# Patient Record
Sex: FEMALE
[De-identification: names, ages, dates, MRNs, and addresses within clinical notes are randomized per-mention and may not be internally consistent; named-entity substitution may affect disease eponyms.]

---

## 2018-01-22 ENCOUNTER — HOSPITAL ENCOUNTER (INPATIENT)
Dept: HOSPITAL 31 - C.ER | Age: 83
LOS: 7 days | Discharge: HOME | DRG: 194 | End: 2018-01-29
Attending: INTERNAL MEDICINE | Admitting: INTERNAL MEDICINE
Payer: MEDICARE

## 2018-01-22 DIAGNOSIS — J44.0: ICD-10-CM

## 2018-01-22 DIAGNOSIS — E11.42: ICD-10-CM

## 2018-01-22 DIAGNOSIS — I11.0: ICD-10-CM

## 2018-01-22 DIAGNOSIS — N28.9: ICD-10-CM

## 2018-01-22 DIAGNOSIS — T14.90XA: ICD-10-CM
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
## 2018-01-22 DIAGNOSIS — J45.901: ICD-10-CM

## 2018-01-22 DIAGNOSIS — E78.00: ICD-10-CM

## 2018-01-22 DIAGNOSIS — J18.9: Primary | ICD-10-CM

## 2018-01-22 DIAGNOSIS — I50.9: ICD-10-CM

## 2018-01-22 DIAGNOSIS — E03.9: ICD-10-CM

## 2018-01-22 DIAGNOSIS — I25.10: ICD-10-CM

## 2018-01-22 LAB
ALBUMIN SERPL-MCNC: 4 G/DL (ref 3.5–5)
ALBUMIN/GLOB SERPL: 0.9 {RATIO} (ref 1–2.1)
ALT SERPL-CCNC: 34 U/L (ref 9–52)
APTT BLD: 27 SECONDS (ref 21–34)
AST SERPL-CCNC: 37 U/L (ref 14–36)
BACTERIA #/AREA URNS HPF: (no result) /[HPF]
BASOPHILS # BLD AUTO: 0.1 K/UL (ref 0–0.2)
BASOPHILS NFR BLD: 0.6 % (ref 0–2)
BILIRUB UR-MCNC: (no result) MG/DL
BUN SERPL-MCNC: 48 MG/DL (ref 7–17)
CALCIUM SERPL-MCNC: 9 MG/DL (ref 8.6–10.4)
EOSINOPHIL # BLD AUTO: 0 K/UL (ref 0–0.7)
EOSINOPHIL NFR BLD: 0.5 % (ref 0–4)
ERYTHROCYTE [DISTWIDTH] IN BLOOD BY AUTOMATED COUNT: 15.7 % (ref 11.5–14.5)
GFR NON-AFRICAN AMERICAN: 15
GLUCOSE UR STRIP-MCNC: NORMAL MG/DL
HDLC SERPL-MCNC: 32 MG/DL (ref 30–70)
HGB BLD-MCNC: 12.4 G/DL (ref 11–16)
HYALINE CASTS #/AREA URNS LPF: (no result) /LPF (ref 0–2)
INR PPP: 1.2
LDLC SERPL-MCNC: 61 MG/DL (ref 0–129)
LEUKOCYTE ESTERASE UR-ACNC: (no result) LEU/UL
LYMPHOCYTES # BLD AUTO: 1.7 K/UL (ref 1–4.3)
LYMPHOCYTES NFR BLD AUTO: 19.4 % (ref 20–40)
MCH RBC QN AUTO: 30.3 PG (ref 27–31)
MCHC RBC AUTO-ENTMCNC: 33.6 G/DL (ref 33–37)
MCV RBC AUTO: 90.2 FL (ref 81–99)
MONOCYTES # BLD: 1.2 K/UL (ref 0–0.8)
MONOCYTES NFR BLD: 13.8 % (ref 0–10)
NEUTROPHILS # BLD: 5.7 K/UL (ref 1.8–7)
NEUTROPHILS NFR BLD AUTO: 65.7 % (ref 50–75)
NRBC BLD AUTO-RTO: 0 % (ref 0–2)
PH UR STRIP: 5 [PH] (ref 5–8)
PLATELET # BLD: 188 K/UL (ref 130–400)
PMV BLD AUTO: 9.1 FL (ref 7.2–11.7)
PROT UR STRIP-MCNC: (no result) MG/DL
PROTHROMBIN TIME: 13 SECONDS (ref 9.7–12.2)
RBC # BLD AUTO: 4.1 MIL/UL (ref 3.8–5.2)
RBC # UR STRIP: (no result) /UL
SP GR UR STRIP: 1.03 (ref 1–1.03)
SQUAMOUS EPITHIAL: 2 /HPF (ref 0–5)
TROPONIN I SERPL-MCNC: 0.11 NG/ML (ref 0–0.12)
URINE NITRATE: NEGATIVE
UROBILINOGEN UR-MCNC: NORMAL MG/DL (ref 0.2–1)
WBC # BLD AUTO: 8.7 K/UL (ref 4.8–10.8)

## 2018-01-22 RX ADMIN — HUMAN INSULIN SCH: 100 INJECTION, SOLUTION SUBCUTANEOUS at 21:30

## 2018-01-22 RX ADMIN — CILOSTAZOL SCH MG: 50 TABLET ORAL at 18:38

## 2018-01-22 NOTE — C.PDOC
History Of Present Illness


83 y/o female, referred by Dr. WISAM Tobar, presents to the ER complaining of 

weakness and lethargy which has been present for 1 week. Patient states that 

she fell today hitting her had. She reports that she is not eating and drinking 

well. Patient denies having any other injuries and medical complaints. Of note, 

patient lives alone. 


Time Seen by Provider: 01/22/18 13:11


Chief Complaint (Nursing): Weakness/Neurological Deficit


History Per: Patient


History/Exam Limitations: no limitations


Onset/Duration Of Symptoms: Days


Current Symptoms Are (Timing): Still Present


Severity: Moderate





Past Medical History


Reviewed: Historical Data, Nursing Documentation, Vital Signs


Vital Signs: 


 Last Vital Signs











Temp  97.9 F   01/23/18 16:00


 


Pulse  81   01/23/18 16:00


 


Resp  20   01/23/18 16:00


 


BP  145/85   01/23/18 16:00


 


Pulse Ox  99   01/23/18 16:00














- Medical History


PMH: Arthritis, Asthma, Bronchitis, CAD, CHF, COPD, Diabetes, HTN, 

Hypercholesterolemia, Hypothyroidism, Osteoporosis, Peripheral Edema


   Denies: Atrial Fibrillation, Mitral Valve Prolapse, Chronic Kidney Disease


Surgical History: 


   Denies: Pacemaker





- [facility] Procedures








ASSISTANCE WITH RESPIRATORY VENTILATION, <24 HRS, CPAP (04/28/16)


INSERTION OF ENDOTRACHEAL AIRWAY INTO TRACHEA, VIA OPENING (04/28/16)


RESPIRATORY VENTILATION, GREATER THAN 96 CONSECUTIVE HOURS (04/28/16)








Family History: States: No Known Family Hx





- Social History


Hx Tobacco Use: No


Hx Alcohol Use: No


Hx Substance Use: No





- Immunization History


Hx Tetanus Toxoid Vaccination: No


Hx Influenza Vaccination: Yes


Hx Pneumococcal Vaccination: No





Review Of Systems


Except As Marked, All Systems Reviewed And Found Negative.


Constitutional: Positive for: Weakness.  Negative for: Fever, Chills


Neurological: Negative for: Weakness, Numbness





Physical Exam





- Physical Exam


Appears: Non-toxic, No Acute Distress, Other (elderly, [place] woman)


Skin: Normal Color, Warm


Head: Atraumatic, Normacephalic


Eye(s): bilateral: Normal Inspection, PERRL


Nose: Normal


Oral Mucosa: Moist


Neck: Supple


Chest: Symmetrical


Cardiovascular: Rhythm Regular


Respiratory: Normal Breath Sounds, No Accessory Muscle Use, No Rales, No Rhonchi

, No Wheezing


Gastrointestinal/Abdominal: Normal Exam, Soft, No Tenderness


Extremity: Normal ROM


Neurological/Psych: Oriented x3, Normal Speech, Normal Cognition, Normal Motor, 

Normal Sensation





ED Course And Treatment





- Laboratory Results


Result Diagrams: 


 01/23/18 07:45





 01/23/18 07:45


O2 Sat by Pulse Oximetry: 96 (RA)


Pulse Ox Interpretation: Normal





- Physician Consult Information


Time Consulting Physician Contacted: 14:15


Physician Contacted: 


Outcome Of Conversation:  is willing to cover the case.





Medical Decision Making


Medical Decision Making: 


Plan:


--Labs


--Urinalysis


--CT-Head


--CXR








Disposition


Doctor Will See Patient In The: Hospital


Counseled Patient/Family Regarding: Studies Performed, Diagnosis





- Disposition


Disposition: HOSPITALIZED


Disposition Time: 16:00


Condition: GOOD





- Clinical Impression


Clinical Impression: 


 Dizziness








- Scribe Statement


The provider has reviewed the documentation as recorded by the Júnior Dominguez


Provider Attestation: 





All medical record entries made by the Júnior were at my direction and 

personally dictated by me. I have reviewed the chart and agree that the record 

accurately reflects my personal performance of the history, physical exam, 

medical decision making, and the department course for this patient. I have 

also personally directed, reviewed, and agree with the discharge instructions 

and disposition.

## 2018-01-22 NOTE — RAD
Chest x-ray single frontal view 



History: Weak. 



Comparison: 05/08/2016 



Findings: 



Patchy increased markings at the left lung base which may represent 

infiltrate and or atelectasis.  Clinical correlation. 



Diffuse increased interstitial lung markings. 



Right hilar prominence. 



Mild calcification at the aortic knob. 



Tortuous aorta. 



Mild cardiomegaly. 



Degenerative changes in spine and shoulders. 



Impression: 



Patchy increased markings at the left lung base which may represent 

infiltrate and or atelectasis. Clinical correlation.

## 2018-01-22 NOTE — CT
PROCEDURE:  CT HEAD WITHOUT CONTRAST.



HISTORY:

fell 1 wk ago, dizzy, poor gait



COMPARISON:

Noncontrast head CT performed 1/21/15 



TECHNIQUE:

Axial computed tomography images were obtained through the head/brain 

without intravenous contrast.  



Radiation dose:



Total exam DLP = 814.25 mGy-cm.



This CT exam was performed using one or more of the following dose 

reduction techniques: Automated exposure control, adjustment of the 

mA and/or kV according to patient size, and/or use of iterative 

reconstruction technique.



FINDINGS:



HEMORRHAGE:

No intracranial hemorrhage. 



BRAIN:

Diffuse atrophy with prominence of the ventricles and sulci noted. No 

mass effect or edema. Intracranial atherosclerosis. Intracranial 

atherosclerotic. Scattered periventricular and subcortical white 

matter hypodensities, which are nonspecific, but often seen with 

chronic microvascular ischemic disease. Please note that MRI with 

diffusion imaging is more sensitive in the detection of acute 

ischemic event.



VENTRICLES:

No hydrocephalus. 



CALVARIUM:

Unremarkable.



PARANASAL SINUSES:

Mild mucosal thickening bilateral maxillary sinuses. 



MASTOID AIR CELLS:

Unremarkable as visualized. No inflammatory changes.



OTHER FINDINGS:

None.



IMPRESSION:

Generalized atrophy.  Moderate nonspecific white matter changes.

## 2018-01-23 LAB
ALBUMIN SERPL-MCNC: 3.3 G/DL (ref 3.5–5)
ALBUMIN/GLOB SERPL: 1 {RATIO} (ref 1–2.1)
ALT SERPL-CCNC: 31 U/L (ref 9–52)
AST SERPL-CCNC: 28 U/L (ref 14–36)
BASOPHILS # BLD AUTO: 0 K/UL (ref 0–0.2)
BASOPHILS NFR BLD: 0.6 % (ref 0–2)
BUN SERPL-MCNC: 32 MG/DL (ref 7–17)
CALCIUM SERPL-MCNC: 8.3 MG/DL (ref 8.6–10.4)
EOSINOPHIL # BLD AUTO: 0.1 K/UL (ref 0–0.7)
EOSINOPHIL NFR BLD: 1.6 % (ref 0–4)
ERYTHROCYTE [DISTWIDTH] IN BLOOD BY AUTOMATED COUNT: 15.3 % (ref 11.5–14.5)
GFR NON-AFRICAN AMERICAN: 36
HGB BLD-MCNC: 11.1 G/DL (ref 11–16)
LYMPHOCYTES # BLD AUTO: 2.2 K/UL (ref 1–4.3)
LYMPHOCYTES NFR BLD AUTO: 28.8 % (ref 20–40)
MCH RBC QN AUTO: 30.3 PG (ref 27–31)
MCHC RBC AUTO-ENTMCNC: 33.5 G/DL (ref 33–37)
MCV RBC AUTO: 90.2 FL (ref 81–99)
MONOCYTES # BLD: 0.8 K/UL (ref 0–0.8)
MONOCYTES NFR BLD: 10 % (ref 0–10)
NEUTROPHILS # BLD: 4.6 K/UL (ref 1.8–7)
NEUTROPHILS NFR BLD AUTO: 59 % (ref 50–75)
NRBC BLD AUTO-RTO: 0 % (ref 0–2)
PLATELET # BLD: 170 K/UL (ref 130–400)
PMV BLD AUTO: 9.4 FL (ref 7.2–11.7)
RBC # BLD AUTO: 3.66 MIL/UL (ref 3.8–5.2)
WBC # BLD AUTO: 7.8 K/UL (ref 4.8–10.8)

## 2018-01-23 RX ADMIN — CILOSTAZOL SCH MG: 50 TABLET ORAL at 11:15

## 2018-01-23 RX ADMIN — HUMAN INSULIN SCH: 100 INJECTION, SOLUTION SUBCUTANEOUS at 21:53

## 2018-01-23 RX ADMIN — CILOSTAZOL SCH MG: 50 TABLET ORAL at 17:58

## 2018-01-23 RX ADMIN — HUMAN INSULIN SCH UNIT: 100 INJECTION, SOLUTION SUBCUTANEOUS at 17:59

## 2018-01-23 RX ADMIN — HUMAN INSULIN SCH: 100 INJECTION, SOLUTION SUBCUTANEOUS at 13:50

## 2018-01-23 RX ADMIN — HUMAN INSULIN SCH: 100 INJECTION, SOLUTION SUBCUTANEOUS at 08:43

## 2018-01-23 NOTE — CARD
--------------- APPROVED REPORT --------------





EKG Measurement

Heart [id number]

OR 138P-[id number]

[id number]

[id number]

[id number]



<Conclusion>

Sinus rhythm with frequent premature ventricular complexes

Left ventricular hypertrophy with repolarization abnormality

Abnormal ECG

## 2018-01-23 NOTE — RAD
PROCEDURE:  Radiographs of the Lumbar Spine.



HISTORY:

s/p fall







COMPARISON:

No prior.



FINDINGS:



BONES:

Generalized osteopenia, with diffuse spondylosis. There is mild 

increased concavity to the superior T12 endplate - prominent 

Schmorl's node indentation versus of mild compression deformity, less 

25 percent, compatible with this. No decreased height of the anterior 

superior vertebral body corner here.  Schmorl's node indentation 

favored. Prominent thoraco lumbar spondylosis also noted.



Mild anterior subluxation of L5 relative to S1 - - due to inferred 

ligamentous laxity given the facet hypertrophic arthrosis here.  No 

spondylolysis noted. 



Generalized osteopenia 



DISC SPACES:

Thoraco lumbar level segmental disc space narrowing.



OTHER FINDINGS:

Atherosclerotic vascular calcifications descending abdominal aorta 

extending into the common iliac bifurcations.



IMPRESSION:

Probable T12 Schmorl's node indentation with prominent anterior 

spondylosis here. No fracture with retropulsion into the spinal canal 

suggested. .  Generalized osteopenia 



L5-S1 minimal subluxation -attributed to ligamentous laxity- facet 

hypertrophic arthrosis noted



Degenerative disc disease with calcified degenerative disc as well 



Atherosclerotic vascular disease

## 2018-01-23 NOTE — MRI
PROCEDURE:  MRI BRAIN WITHOUT CONTRAST



HISTORY:

s/p fall



COMPARISON:

Noncontrast head CT from 01/22/2018. 



TECHNIQUE:

Multiplanar, multisequence MR images of the brain were obtained 

without intravenous contrast enhancement.



FINDINGS:



HEMORRHAGE:

None



DWI:

No evidence of an acute or early subacute infarction.



BRAIN PARENCHYMA:

There are severe chronic microangiopathic changes. There is no mass, 

mass effect or abnormal extra-axial fluid collection. The midline 

sagittal structures are normal.  There is a prominent perivascular 

space in the right basal ganglia.



VENTRICLES:

There is moderate age-related global parenchymal volume loss and 

proportionate enlargement of the ventricles and cortical sulci. There 

is a cavum septum pellucidum.



CRANIUM:

There is normal bone marrow signal pattern.



ORBITS:

Grossly unremarkable.



PARANASAL SINUSES/MASTOIDS:

There is mild mucosal thickening in the paranasal sinuses. The 

mastoid air cells are clear.



VASCULAR SYSTEM:

There are normal signal voids in the larger intracranial arteries. 



OTHER FINDINGS:

None. 



IMPRESSION:

No acute intracranial abnormality.



Severe chronic microangiopathic changes and moderate age-related 

global parenchymal volume loss.

## 2018-01-23 NOTE — CP.PCM.HP
History of Present Illness





- History of Present Illness


History of Present Illness: 





COMPREHENSIVE   HISTORY & PHYSICAL EXAM 


   


HPI


BROUGHT TO ER AFTER A FALL WHILE GETTING OUT OF [employer]  ON THE DAY OF 

ADMISSION . NO LOC/SEIZURES 


SUSTAINED MILD HEAD TRAUMA .


ER , CT HEAD NEG FOR ACUTE ABNORMALITY , CR HAS INCREASED TO 3.0 FROM BASELINE 

1.2 





PAST HIST.


T2DM .HTN.


PERSONAL HIST:   Smoking.   N   Alcohol.   N      Allergy N            Travel_-

      .


FAMILY HIST : 


ROS :


Constitutional: Negative for weight change, chills, night sweats, fatigue and 

usage of assist device. 


  Eyes: Negative for redness, swelling, itching, discharge, vision changes, 

blurry vision, double vision, glaucoma, cataracts, 


  Ears: Negative for hearing loss, ringing, , tinnitus, vertigo


 Nose: Negative for rhinorrhea, stuffiness, sniffing, itching, postnasal drip, 

discoloration, nasal congestion and epistaxis. 


 Throat: Negative for throat clearing, sore throat, hoarseness, difficulty 

swallowing and difficulty speaking. 


  Respiratory: Negative for cough, , sputum production, chest tightness,  

wheezing, pleuritic chest pain ,daytime somnolence, chronic cough, hemoptysis, 

snoring at night,


 Cardiovascular: Negative for chest pain, palpitations, orthopnea, PND, Edema 

of legs, leg cramps, angina, claudication, , irregular heartbeat,


 Neurology: Negative for irritability, muscle weakness, numbness and tingling, 

seizures, tremors, migraines, slurred speech, syncope, memory loss, mood changes

, recurrent headaches  LOWER EXT NUMBNESS AND LOSS OF SENSATION 


Gastrointestinal: Negative for difficulty swallowing, diarrhea, constipation, 

black stools, rectal bleeding, nausea, flatulence, reflux, poor appetite, 

changes in bowel habits, abdominal pain


  Genitourinary: Negative for frequent urination, hematuria, discharge, 

incontinence, urinary retention, frequent UTI, Psychiatric: Negative for 

depression, anxiety/panic, suicidal tendencies, 


Musculoskeletal: LOWER BACK PAIN  


 Skin: Negative for rash, ulcers, itching, dry skin and pigmented lesions.


P/E: 


  Constitutional: Appears stated age and in no apparent distress. 


 Head: Normocephalic. 


  Ears: External ear canals patent without inflammation. Tympanic membranes 

intact with normal light reflex and landmark. 


  Eyes: Pupils are central, bilaterally equal, symmetrical and reacts to light 

with normal movements and no icterus or pallor. 


 Nose: External nares are patent. Mucosa is pink  Mouth-Throat: Good general 

appearance and condition. No post-pharyngeal/oropharyngeal erythema and 

tonsillar hypertrophy. Good dental hygiene. 


  Neck-Lymphatic: Neck is supple with normal ROM, no thyromegaly, lymph nodes 

or masses. JVD is normal with no carotid bruit. 


  Lungs: Clear to percussion and auscultation with bilateral normal air entry. 


  Cardiovascular: S1 and S2 are normal with no murmurs, gallops and rub. 


  GI Exam: No hepatomegaly. Abdomen is soft and non-tender. No Organomegaly , 

masses or hernias are evident and bowel sounds are normal and active. 


  Neurology: Higher function and all cranial nerves intact, with no gross motor 

or sensory deficit. Superficial and deep reflexes are normal with downwards 

planters. No cerebellar deficit with normal gait. DECREASE SENSATION LOWER EXT/

SOLES 


  Musculoskeletal: No tender spots with normal curvature of the spine with no 

swelling or restricted ROM of the small and large joints. 


  Extremities: Homans sign absent. Intact pulses with no pitting edema, calf 

tenderness or skin color changes. 


  Skin: No rash, eruptions or abnormal skin pigmentation





LAB/RADIOLOGY:


ASSESMENT :


H/O FALL 


ACUTE RENAL INSUFFICIENCY 


DIABETIC PERIPHERAL NEUROPATHY 


T2DM 


LOW BACK PAIN 





PLAN: 


IV FLUID/MRI HEAD/X L/S SPINE 








Present on Admission





- Present on Admission


Any Indicators Present on Admission: No





Past Patient History





- Past Medical History & Family History


Past Medical History?: Yes





- Past Social History


Smoking Status: Never Smoked





- CARDIAC


Hx Atrial Fibrillation: No


Hx Congestive Heart Failure: Yes


Hx Hypercholesterolemia: Yes


Hx Hypertension: Yes


Hx Mitral Valve Prolapse: No


Hx Pacemaker: No


Hx Peripheral Edema: Yes





- PULMONARY


Hx Asthma: Yes


Hx Bronchitis: Yes


Hx Chronic Obstructive Pulmonary Disease (COPD): Yes





- NEUROLOGICAL


Hx Neurological Disorder: No


Hx Dizziness: Yes





- HEENT


Hx HEENT Problems: Yes


Hx Cataracts: Yes (cataract surgery 3 years ago?)


Hx Glaucoma: No


Other/Comment: wear eyeglasses





- RENAL


Hx Chronic Kidney Disease: No





- ENDOCRINE/METABOLIC


Hx Hypothyroidism: Yes





- HEMATOLOGICAL/ONCOLOGICAL


Hx Blood Disorders: No





- INTEGUMENTARY


Hx Dermatological Problems: No





- MUSCULOSKELETAL/RHEUMATOLOGICAL


Hx Arthritis: Yes


Hx Falls: Yes (from home)


Hx Osteoporosis: Yes





- GASTROINTESTINAL


Hx Gastrointestinal Disorders: No





- GENITOURINARY/GYNECOLOGICAL


Hx Genitourinary Disorders: No





- PSYCHIATRIC


Hx Substance Use: No





- SURGICAL HISTORY


Hx Surgeries: Yes


Hx Orthopedic Surgery: Yes (left arm surgery 8 yrs ago)





- ANESTHESIA


Hx Anesthesia: Yes


Hx Anesthesia Reactions: No


Hx Malignant Hyperthermia: No





Meds


Allergies/Adverse Reactions: 


 Allergies











Allergy/AdvReac Type Severity Reaction Status Date / Time


 


No Known Allergies Allergy   Verified 01/22/18 12:21














Results





- Vital Signs


Recent Vital Signs: 





 Last Vital Signs











Temp  98.1 F   01/23/18 07:50


 


Pulse  72   01/23/18 07:50


 


Resp  20   01/23/18 07:50


 


BP  115/67   01/23/18 07:50


 


Pulse Ox  99   01/23/18 07:50














- Labs


Result Diagrams: 


 01/23/18 07:45





 01/23/18 07:45


Labs: 





 Laboratory Results - last 24 hr











  01/22/18 01/22/18 01/22/18





  13:24 13:24 13:24


 


WBC  8.7  


 


RBC  4.10  


 


Hgb  12.4  D  


 


Hct  37.0  


 


MCV  90.2  D  


 


MCH  30.3  


 


MCHC  33.6  


 


RDW  15.7 H  


 


Plt Count  188  


 


MPV  9.1  


 


Neut % (Auto)  65.7  


 


Lymph % (Auto)  19.4 L  


 


Mono % (Auto)  13.8 H  


 


Eos % (Auto)  0.5  


 


Baso % (Auto)  0.6  


 


Neut #  5.7  


 


Lymph #  1.7  


 


Mono #  1.2 H  


 


Eos #  0.0  


 


Baso #  0.1  


 


PT   13.0 H 


 


INR   1.2 


 


APTT   27 


 


Sodium    134


 


Potassium    5.4 H


 


Chloride    103


 


Carbon Dioxide    19 L


 


Anion Gap    17


 


BUN    48 H


 


Creatinine    3.0 H


 


Est GFR ( Amer)    18


 


Est GFR (Non-Af Amer)    15


 


POC Glucose (mg/dL)   


 


Random Glucose    133 H


 


Hemoglobin A1c   


 


Calcium    9.0


 


Total Bilirubin    0.7


 


AST    37 H


 


ALT    34


 


Alkaline Phosphatase    39


 


Troponin I    0.1080


 


Total Protein    8.2


 


Albumin    4.0


 


Globulin    4.2 H


 


Albumin/Globulin Ratio    0.9 L


 


Triglycerides    93  D


 


Cholesterol    117


 


LDL Cholesterol Direct    61


 


HDL Cholesterol    32


 


Urine Color   


 


Urine Clarity   


 


Urine pH   


 


Ur Specific Gravity   


 


Urine Protein   


 


Urine Glucose (UA)   


 


Urine Ketones   


 


Urine Blood   


 


Urine Nitrate   


 


Urine Bilirubin   


 


Urine Urobilinogen   


 


Ur Leukocyte Esterase   


 


Urine WBC (Auto)   


 


Urine RBC (Auto)   


 


Ur Squamous Epith Cells   


 


Urine Bacteria   


 


Hyaline Casts   














  01/22/18 01/22/18 01/22/18





  13:24 14:49 17:10


 


WBC   


 


RBC   


 


Hgb   


 


Hct   


 


MCV   


 


MCH   


 


MCHC   


 


RDW   


 


Plt Count   


 


MPV   


 


Neut % (Auto)   


 


Lymph % (Auto)   


 


Mono % (Auto)   


 


Eos % (Auto)   


 


Baso % (Auto)   


 


Neut #   


 


Lymph #   


 


Mono #   


 


Eos #   


 


Baso #   


 


PT   


 


INR   


 


APTT   


 


Sodium   


 


Potassium   


 


Chloride   


 


Carbon Dioxide   


 


Anion Gap   


 


BUN   


 


Creatinine   


 


Est GFR ( Amer)   


 


Est GFR (Non-Af Amer)   


 


POC Glucose (mg/dL)    72


 


Random Glucose   


 


Hemoglobin A1c  7.5 H  


 


Calcium   


 


Total Bilirubin   


 


AST   


 


ALT   


 


Alkaline Phosphatase   


 


Troponin I   


 


Total Protein   


 


Albumin   


 


Globulin   


 


Albumin/Globulin Ratio   


 


Triglycerides   


 


Cholesterol   


 


LDL Cholesterol Direct   


 


HDL Cholesterol   


 


Urine Color   Carolyn 


 


Urine Clarity   Hazy 


 


Urine pH   5.0 


 


Ur Specific Gravity   1.026 


 


Urine Protein   1+ H 


 


Urine Glucose (UA)   Normal 


 


Urine Ketones   Negative 


 


Urine Blood   2+ H 


 


Urine Nitrate   Negative 


 


Urine Bilirubin   1+ H 


 


Urine Urobilinogen   Normal 


 


Ur Leukocyte Esterase   1+ H 


 


Urine WBC (Auto)   6 H 


 


Urine RBC (Auto)   12 H 


 


Ur Squamous Epith Cells   2 


 


Urine Bacteria   Rare 


 


Hyaline Casts   6-10 H 














  01/22/18 01/23/18 01/23/18





  21:11 07:14 07:45


 


WBC    7.8


 


RBC    3.66 L


 


Hgb    11.1


 


Hct    33.0 L


 


MCV    90.2


 


MCH    30.3


 


MCHC    33.5


 


RDW    15.3 H


 


Plt Count    170


 


MPV    9.4


 


Neut % (Auto)    59.0


 


Lymph % (Auto)    28.8


 


Mono % (Auto)    10.0


 


Eos % (Auto)    1.6


 


Baso % (Auto)    0.6


 


Neut #    4.6


 


Lymph #    2.2


 


Mono #    0.8


 


Eos #    0.1


 


Baso #    0.0


 


PT   


 


INR   


 


APTT   


 


Sodium   


 


Potassium   


 


Chloride   


 


Carbon Dioxide   


 


Anion Gap   


 


BUN   


 


Creatinine   


 


Est GFR ( Amer)   


 


Est GFR (Non-Af Amer)   


 


POC Glucose (mg/dL)  295 H  105 


 


Random Glucose   


 


Hemoglobin A1c   


 


Calcium   


 


Total Bilirubin   


 


AST   


 


ALT   


 


Alkaline Phosphatase   


 


Troponin I   


 


Total Protein   


 


Albumin   


 


Globulin   


 


Albumin/Globulin Ratio   


 


Triglycerides   


 


Cholesterol   


 


LDL Cholesterol Direct   


 


HDL Cholesterol   


 


Urine Color   


 


Urine Clarity   


 


Urine pH   


 


Ur Specific Gravity   


 


Urine Protein   


 


Urine Glucose (UA)   


 


Urine Ketones   


 


Urine Blood   


 


Urine Nitrate   


 


Urine Bilirubin   


 


Urine Urobilinogen   


 


Ur Leukocyte Esterase   


 


Urine WBC (Auto)   


 


Urine RBC (Auto)   


 


Ur Squamous Epith Cells   


 


Urine Bacteria   


 


Hyaline Casts   














  01/23/18 01/23/18





  07:45 11:01


 


WBC  


 


RBC  


 


Hgb  


 


Hct  


 


MCV  


 


MCH  


 


MCHC  


 


RDW  


 


Plt Count  


 


MPV  


 


Neut % (Auto)  


 


Lymph % (Auto)  


 


Mono % (Auto)  


 


Eos % (Auto)  


 


Baso % (Auto)  


 


Neut #  


 


Lymph #  


 


Mono #  


 


Eos #  


 


Baso #  


 


PT  


 


INR  


 


APTT  


 


Sodium  133 


 


Potassium  4.3 


 


Chloride  108 H 


 


Carbon Dioxide  18 L 


 


Anion Gap  12 


 


BUN  32 H 


 


Creatinine  1.4 H 


 


Est GFR ( Amer)  44 


 


Est GFR (Non-Af Amer)  36 


 


POC Glucose (mg/dL)   170 H


 


Random Glucose  117 H 


 


Hemoglobin A1c  


 


Calcium  8.3 L 


 


Total Bilirubin  0.4 


 


AST  28 


 


ALT  31 


 


Alkaline Phosphatase  48 


 


Troponin I  


 


Total Protein  6.8 


 


Albumin  3.3 L 


 


Globulin  3.5 


 


Albumin/Globulin Ratio  1.0 


 


Triglycerides  


 


Cholesterol  


 


LDL Cholesterol Direct  


 


HDL Cholesterol  


 


Urine Color  


 


Urine Clarity  


 


Urine pH  


 


Ur Specific Gravity  


 


Urine Protein  


 


Urine Glucose (UA)  


 


Urine Ketones  


 


Urine Blood  


 


Urine Nitrate  


 


Urine Bilirubin  


 


Urine Urobilinogen  


 


Ur Leukocyte Esterase  


 


Urine WBC (Auto)  


 


Urine RBC (Auto)  


 


Ur Squamous Epith Cells  


 


Urine Bacteria  


 


Hyaline Casts

## 2018-01-24 LAB
ALBUMIN SERPL-MCNC: 3.6 G/DL (ref 3.5–5)
ALBUMIN/GLOB SERPL: 1 {RATIO} (ref 1–2.1)
ALT SERPL-CCNC: 32 U/L (ref 9–52)
AST SERPL-CCNC: 29 U/L (ref 14–36)
BUN SERPL-MCNC: 12 MG/DL (ref 7–17)
CALCIUM SERPL-MCNC: 9.1 MG/DL (ref 8.6–10.4)
ERYTHROCYTE [DISTWIDTH] IN BLOOD BY AUTOMATED COUNT: 15.2 % (ref 11.5–14.5)
GFR NON-AFRICAN AMERICAN: > 60
HGB BLD-MCNC: 11.9 G/DL (ref 11–16)
MCH RBC QN AUTO: 30.4 PG (ref 27–31)
MCHC RBC AUTO-ENTMCNC: 33.8 G/DL (ref 33–37)
MCV RBC AUTO: 89.8 FL (ref 81–99)
PLATELET # BLD: 187 K/UL (ref 130–400)
PMV BLD AUTO: 9 FL (ref 7.2–11.7)
RBC # BLD AUTO: 3.92 MIL/UL (ref 3.8–5.2)
WBC # BLD AUTO: 8 K/UL (ref 4.8–10.8)

## 2018-01-24 RX ADMIN — HUMAN INSULIN SCH: 100 INJECTION, SOLUTION SUBCUTANEOUS at 13:21

## 2018-01-24 RX ADMIN — CILOSTAZOL SCH MG: 50 TABLET ORAL at 17:51

## 2018-01-24 RX ADMIN — HUMAN INSULIN SCH: 100 INJECTION, SOLUTION SUBCUTANEOUS at 08:12

## 2018-01-24 RX ADMIN — CILOSTAZOL SCH MG: 50 TABLET ORAL at 11:40

## 2018-01-24 RX ADMIN — IPRATROPIUM BROMIDE AND ALBUTEROL SULFATE SCH ML: .5; 3 SOLUTION RESPIRATORY (INHALATION) at 19:47

## 2018-01-24 RX ADMIN — HUMAN INSULIN SCH UNIT: 100 INJECTION, SOLUTION SUBCUTANEOUS at 16:30

## 2018-01-24 NOTE — CP.PCM.PN
Subjective





- Date & Time of Evaluation


Date of Evaluation: 01/24/18


Time of Evaluation: 13:21





- Subjective


Subjective: 





CHIEF COMPLAINTS TODAY :


GEN WEAKNESS , WALKS WITH ASSISTANCE


CXR  ? PNEUMONIA  





ROS.


HEENT :  N.


Resp :       No  wheezing ,pleuritic CP ,or hemoptysis 


Cardio :     No anginal  CP, PND, orthopnea, palpitation 


GI :           No abd.pain, n/v ,diarrhea or GI bleeding .


CNS : No headache, vertigo, focal deficit.


Musculoskel :  No joint swelling ,


Derm :        No rash 


Psych :     Normal affect.


Ext :  No  swelling ,calf pain 





PE.


Pt. is alert awake in no distress.


V.S  As noted in the chart 


Head ,ear nose,throat and eyes : Normal.


Neck : Supple with normal carotids.


Lungs: Clear air entry.


Heart : S1 & S2 normal with S4. No murmur.


Abd : Soft non tender with normal bowel sounds.


Neuro : Moves all ext. with no localized deficit.


Ext : No edema with intact pulses.Non tender calves 


Derm : No rashes or decubitus ulcer.





LABS/RADIOLOGY: MRI   BRAIN NEG FOR ACUTE CHANGES 





ASSESSMENT/PLAN : 


ID EVAL 


PT 











Objective





- Vital Signs/Intake and Output


Vital Signs (last 24 hours): 


 











Temp Pulse Resp BP Pulse Ox


 


 98.2 F   82   20   161/70 H  95 


 


 01/24/18 08:28  01/24/18 08:28  01/24/18 08:28  01/24/18 11:45  01/24/18 08:28











- Medications


Medications: 


 Current Medications





Albuterol/Ipratropium (Duoneb 3 Mg/0.5 Mg (3 Ml) Ud)  3 ml INH RQ6 [facility]


Aspirin (Aspirin Chewable)  81 mg PO DAILY [facility]


   Last Admin: 01/24/18 11:42 Dose:  81 mg


Cilostazol (Pletal)  50 mg PO BID [facility]


   Last Admin: 01/24/18 11:40 Dose:  50 mg


Gabapentin (Neurontin)  300 mg PO DAILY [facility]


   Last Admin: 01/24/18 11:42 Dose:  300 mg


Heparin Sodium (Porcine) (Heparin)  5,000 units SC Q12 [facility]


   Last Admin: 01/22/18 21:33 Dose:  5,000 units


Home Med (Patient's Own Drops)  2 drop OU BID [facility]


   Last Admin: 01/24/18 11:43 Dose:  2 drop


Azithromycin 500 mg/ Dextrose  250 mls @ 167 mls/hr IVPB Q24H [facility]


   Last Admin: 01/23/18 21:52 Dose:  167 mls/hr


Insulin Human Regular (Novolin R)  0 unit SC ACHS [facility]


   PRN Reason: Protocol


   Last Admin: 01/24/18 08:12 Dose:  Not Given


Losartan Potassium (Cozaar)  100 mg PO DAILY [facility]


   Last Admin: 01/24/18 11:42 Dose:  100 mg


Metoprolol Tartrate (Lopressor)  50 mg PO BID [facility]


   Last Admin: 01/24/18 11:42 Dose:  50 mg











- Labs


Labs: 


 





 01/24/18 08:02 





 01/24/18 08:02 





 











PT  13.0 SECONDS (9.7-12.2)  H  01/22/18  13:24    


 


INR  1.2   01/22/18  13:24    


 


APTT  27 SECONDS (21-34)   01/22/18  13:24

## 2018-01-24 NOTE — CT
PROCEDURE:  CT Chest without contrast



HISTORY:

pneumonia



COMPARISON:

5/3/2016



TECHNIQUE:

Contiguous axial images were obtained through the chest without 

intravenous contrast enhancement. Sagittal and coronal 

reconstructions were performed.







Radiation dose (DLP): 425.10 mGy-cm. 



This CT exam was performed using one or more of the following dose 

reduction techniques: Automated exposure control, adjustment of the 

mA and/or kV according to patient size, and/or use of iterative 

reconstruction technique.



FINDINGS:



LUNGS:

Multifocal ground-glass opacities in the upper lobes.  Compared to 

prior examination, there has been clearing of the lower lobe 

ground-glass opacities. Probable fibrotic interstitial changes at the 

lung bases. There are some patchy areas of suzy consolidation in the 

right upper lobe. 



MEDIASTINUM:

Unremarkable thoracic aorta. No aneurysm. Cardiomegaly.  Mitral 

annular calcification.  Coronary arterial calcification. Main 

pulmonary artery unremarkable. No vascular congestion. Shotty lymph 

nodes. No significantly enlarged mediastinal or hilar nodes are 

appreciated.  There is a nodule in the thyroid isthmus measuring 

approximately 1.5 cm.  Recommend correlation with thyroid ultrasound 

examination.



PLEURA:

No pleural fluid. No pneumothorax.



BONES:

No fracture. No destructive lesion. 



UPPER ABDOMEN:

Mild bilateral adrenal hypertrophy.



OTHER FINDINGS:

None.



IMPRESSION:

Multifocal ground-glass opacities in the upper lobes with some patchy 

areas of suzy consolidation in the right upper lobe. Possible 

pneumonia. Interstitial fibrotic changes at the lung bases. 

Cardiomegaly. Additional minor findings as above.

## 2018-01-24 NOTE — CP.PCM.CON
History of Present Illness





- History of Present Illness


History of Present Illness: 





INFECTIOUS DISEASE CONSULT.


PATIENT SEEN.





CHART REVIEWED, STRESS WITH FAMILY MEMBERS DAUGHTER AT THE BEDSIDE.





INFECTIOUS DISEASE CONSULT DICTATED.


DICTATION #[id number].





Past Patient History





- Past Medical History & Family History


Past Medical History?: Yes





- Past Social History


Smoking Status: Never Smoked





- CARDIAC


Hx Atrial Fibrillation: No


Hx Congestive Heart Failure: Yes


Hx Hypercholesterolemia: Yes


Hx Hypertension: Yes


Hx Mitral Valve Prolapse: No


Hx Pacemaker: No


Hx Peripheral Edema: Yes





- PULMONARY


Hx Asthma: Yes


Hx Bronchitis: Yes


Hx Chronic Obstructive Pulmonary Disease (COPD): Yes





- NEUROLOGICAL


Hx Neurological Disorder: No


Hx Dizziness: Yes





- HEENT


Hx HEENT Problems: Yes


Hx Cataracts: Yes (cataract surgery 3 years ago?)


Hx Glaucoma: No


Other/Comment: wear eyeglasses





- RENAL


Hx Chronic Kidney Disease: No





- ENDOCRINE/METABOLIC


Hx Hypothyroidism: Yes





- HEMATOLOGICAL/ONCOLOGICAL


Hx Blood Disorders: No





- INTEGUMENTARY


Hx Dermatological Problems: No





- MUSCULOSKELETAL/RHEUMATOLOGICAL


Hx Arthritis: Yes


Hx Osteoporosis: Yes





- GASTROINTESTINAL


Hx Gastrointestinal Disorders: No





- GENITOURINARY/GYNECOLOGICAL


Hx Genitourinary Disorders: No





- PSYCHIATRIC


Hx Substance Use: No





- SURGICAL HISTORY


Hx Surgeries: Yes


Hx Orthopedic Surgery: Yes (left arm surgery 8 yrs ago)





- ANESTHESIA


Hx Anesthesia: Yes


Hx Anesthesia Reactions: No


Hx Malignant Hyperthermia: No





Meds


Allergies/Adverse Reactions: 


 Allergies











Allergy/AdvReac Type Severity Reaction Status Date / Time


 


No Known Allergies Allergy   Verified 01/22/18 12:21














- Medications


Medications: 


 Current Medications





Albuterol/Ipratropium (Duoneb 3 Mg/0.5 Mg (3 Ml) Ud)  3 ml INH RQ6 [facility]


Aspirin (Aspirin Chewable)  81 mg PO DAILY [facility]


   Last Admin: 01/24/18 11:42 Dose:  81 mg


Cilostazol (Pletal)  50 mg PO BID [facility]


   Last Admin: 01/24/18 11:40 Dose:  50 mg


Gabapentin (Neurontin)  300 mg PO DAILY [facility]


   Last Admin: 01/24/18 11:42 Dose:  300 mg


Heparin Sodium (Porcine) (Heparin)  5,000 units SC Q12 [facility]


   Last Admin: 01/22/18 21:33 Dose:  5,000 units


Home Med (Patient's Own Drops)  2 drop OU BID [facility]


   Last Admin: 01/24/18 11:43 Dose:  2 drop


Azithromycin 500 mg/ Dextrose  250 mls @ 167 mls/hr IVPB Q24H [facility]


   Last Admin: 01/23/18 21:52 Dose:  167 mls/hr


Insulin Human Regular (Novolin R)  0 unit SC ACHS [facility]


   PRN Reason: Protocol


   Last Admin: 01/24/18 13:21 Dose:  Not Given


Losartan Potassium (Cozaar)  100 mg PO DAILY [facility]


   Last Admin: 01/24/18 11:42 Dose:  100 mg


Metoprolol Tartrate (Lopressor)  50 mg PO BID [facility]


   Last Admin: 01/24/18 11:42 Dose:  50 mg











Results





- Vital Signs


Recent Vital Signs: 


 Last Vital Signs











Temp  98.2 F   01/24/18 08:28


 


Pulse  82   01/24/18 08:28


 


Resp  20   01/24/18 08:28


 


BP  161/70 H  01/24/18 11:45


 


Pulse Ox  95   01/24/18 08:28














- Labs


Result Diagrams: 


 01/24/18 08:02





 01/24/18 08:02


Labs: 


 Laboratory Results - last 24 hr











  01/23/18 01/23/18 01/24/18





  16:24 21:08 07:39


 


WBC   


 


RBC   


 


Hgb   


 


Hct   


 


MCV   


 


MCH   


 


MCHC   


 


RDW   


 


Plt Count   


 


MPV   


 


Sodium   


 


Potassium   


 


Chloride   


 


Carbon Dioxide   


 


Anion Gap   


 


BUN   


 


Creatinine   


 


Est GFR ( Amer)   


 


Est GFR (Non-Af Amer)   


 


POC Glucose (mg/dL)  197 H  101  112 H


 


Random Glucose   


 


Calcium   


 


Total Bilirubin   


 


AST   


 


ALT   


 


Alkaline Phosphatase   


 


Total Protein   


 


Albumin   


 


Globulin   


 


Albumin/Globulin Ratio   














  01/24/18 01/24/18 01/24/18





  08:02 08:02 11:35


 


WBC  8.0  


 


RBC  3.92  


 


Hgb  11.9  


 


Hct  35.2  


 


MCV  89.8  


 


MCH  30.4  


 


MCHC  33.8  


 


RDW  15.2 H  


 


Plt Count  187  


 


MPV  9.0  


 


Sodium   131 L 


 


Potassium   4.7 


 


Chloride   103 


 


Carbon Dioxide   22 


 


Anion Gap   10 


 


BUN   12 


 


Creatinine   0.8 


 


Est GFR ( Amer)   > 60 


 


Est GFR (Non-Af Amer)   > 60 


 


POC Glucose (mg/dL)    163 H


 


Random Glucose   122 H 


 


Calcium   9.1 


 


Total Bilirubin   0.8 


 


AST   29 


 


ALT   32 


 


Alkaline Phosphatase   46 


 


Total Protein   7.2 


 


Albumin   3.6 


 


Globulin   3.6 


 


Albumin/Globulin Ratio   1.0

## 2018-01-25 LAB
ALBUMIN SERPL-MCNC: 3.5 G/DL (ref 3.5–5)
ALBUMIN/GLOB SERPL: 1 {RATIO} (ref 1–2.1)
ALT SERPL-CCNC: 38 U/L (ref 9–52)
AST SERPL-CCNC: 32 U/L (ref 14–36)
BILIRUB DIRECT SERPL-MCNC: 0.5 MG/DL (ref 0–0.4)
BNP SERPL-MCNC: 2670 PG/ML (ref 0–900)
MYCOPLASMA PNEUMONIAE IGM: NEGATIVE

## 2018-01-25 RX ADMIN — HUMAN INSULIN SCH UNIT: 100 INJECTION, SOLUTION SUBCUTANEOUS at 12:27

## 2018-01-25 RX ADMIN — IPRATROPIUM BROMIDE AND ALBUTEROL SULFATE SCH: .5; 3 SOLUTION RESPIRATORY (INHALATION) at 13:51

## 2018-01-25 RX ADMIN — HUMAN INSULIN SCH: 100 INJECTION, SOLUTION SUBCUTANEOUS at 08:12

## 2018-01-25 RX ADMIN — HUMAN INSULIN SCH: 100 INJECTION, SOLUTION SUBCUTANEOUS at 21:48

## 2018-01-25 RX ADMIN — CILOSTAZOL SCH MG: 50 TABLET ORAL at 10:47

## 2018-01-25 RX ADMIN — CILOSTAZOL SCH MG: 50 TABLET ORAL at 18:27

## 2018-01-25 RX ADMIN — IPRATROPIUM BROMIDE AND ALBUTEROL SULFATE SCH: .5; 3 SOLUTION RESPIRATORY (INHALATION) at 01:40

## 2018-01-25 RX ADMIN — IPRATROPIUM BROMIDE AND ALBUTEROL SULFATE SCH ML: .5; 3 SOLUTION RESPIRATORY (INHALATION) at 09:18

## 2018-01-25 RX ADMIN — HUMAN INSULIN SCH UNIT: 100 INJECTION, SOLUTION SUBCUTANEOUS at 18:41

## 2018-01-25 RX ADMIN — IPRATROPIUM BROMIDE AND ALBUTEROL SULFATE SCH ML: .5; 3 SOLUTION RESPIRATORY (INHALATION) at 19:45

## 2018-01-25 NOTE — CP.PCM.PN
Subjective





- Date & Time of Evaluation


Date of Evaluation: 01/25/18


Time of Evaluation: 13:31





- Subjective


Subjective: 





CHIEF COMPLAINTS TODAY :


GEN WEAKNESS , WALKS WITH ASSISTANCE


CXR  ? PNEUMONIA  





ROS.


HEENT :  N.


Resp :       No  wheezing ,pleuritic CP ,or hemoptysis 


Cardio :     No anginal  CP, PND, orthopnea, palpitation 


GI :           No abd.pain, n/v ,diarrhea or GI bleeding .


CNS : No headache, vertigo, focal deficit.


Musculoskel :  No joint swelling ,


Derm :        No rash 


Psych :     Normal affect.


Ext :  No  swelling ,calf pain 





PE.


Pt. is alert awake in no distress.


V.S  As noted in the chart 


Head ,ear nose,throat and eyes : Normal.


Neck : Supple with normal carotids.


Lungs: Clear air entry.


Heart : S1 & S2 normal with S4. No murmur.


Abd : Soft non tender with normal bowel sounds.


Neuro : Moves all ext. with no localized deficit.


Ext : No edema with intact pulses.Non tender calves 


Derm : No rashes or decubitus ulcer.





LABS/RADIOLOGY: ct chestBIL APICAL PNEUMONIA 





PLAN;


IV AB 


?TB W/U





Objective





- Vital Signs/Intake and Output


Vital Signs (last 24 hours): 


 











Temp Pulse Resp BP Pulse Ox


 


 98 F   68   20   131/85   96 


 


 01/25/18 08:21  01/25/18 08:21  01/25/18 08:21  01/25/18 08:21  01/25/18 08:21











- Medications


Medications: 


 Current Medications





Albuterol/Ipratropium (Duoneb 3 Mg/0.5 Mg (3 Ml) Ud)  3 ml INH RQ6 [facility]


   Last Admin: 01/25/18 09:18 Dose:  3 ml


Aspirin (Aspirin Chewable)  81 mg PO DAILY [facility]


   Last Admin: 01/25/18 10:45 Dose:  81 mg


Cilostazol (Pletal)  50 mg PO BID [facility]


   Last Admin: 01/25/18 10:47 Dose:  50 mg


Gabapentin (Neurontin)  300 mg PO DAILY [facility]


   Last Admin: 01/25/18 10:44 Dose:  300 mg


Heparin Sodium (Porcine) (Heparin)  5,000 units SC Q12 [facility]


   Last Admin: 01/22/18 21:33 Dose:  5,000 units


Home Med (Patient's Own Drops)  2 drop OU BID [facility]


   Last Admin: 01/25/18 10:51 Dose:  2 drop


Azithromycin 500 mg/ Dextrose  250 mls @ 167 mls/hr IVPB Q24H [facility]


   Last Admin: 01/24/18 18:30 Dose:  167 mls/hr


Ceftriaxone Sodium 1 gm/ (Sodium Chloride)  100 mls @ 50 mls/30 min IVPB Q12H 

[facility]


   Last Admin: 01/25/18 01:51 Dose:  50 mls/30 min


Insulin Human Regular (Novolin R)  0 unit SC ACHS [facility]


   PRN Reason: Protocol


   Last Admin: 01/25/18 12:27 Dose:  3 unit


Losartan Potassium (Cozaar)  100 mg PO DAILY [facility]


   Last Admin: 01/25/18 10:45 Dose:  100 mg


Metoprolol Tartrate (Lopressor)  50 mg PO BID [facility]


   Last Admin: 01/25/18 10:45 Dose:  50 mg











- Labs


Labs: 


 





 01/24/18 08:02 





 01/24/18 08:02 





 











PT  13.0 SECONDS (9.7-12.2)  H  01/22/18  13:24    


 


INR  1.2   01/22/18  13:24    


 


APTT  27 SECONDS (21-34)   01/22/18  13:24

## 2018-01-25 NOTE — CON
DATE:



INFECTIOUS DISEASE CONSULTATION



REQUESTED BY:  Dr. Ribera.



REASON FOR CONSULTATION:  Pneumonia with generalized weakness and acute

renal insufficiency.



HISTORY OF PRESENT ILLNESS:  The patient is an 82-year-old [place] female

who was admitted on 01/22/2018 because of generalized weakness and

lethargy, which was present there for about a week.  As reported by the

family, patient hit her head while entering an automobile, but denies any

loss of consciousness.  Initial CAT scan showed no acute pathology or

intracranial hemorrhage.  Also, reported that she has not been eating and

drinking well.  On admission, patient was found to have increased

creatinine of 3.  Chest x-ray on admission showed a patchy increased

interstitial left lung base markings with questionable infiltrate or

atelectasis.  Patient has been complaining of dry cough and wheezing. 

Patient also states today, she had chills this morning.  History obtained

mainly from the daughter who was at the bedside.  Infectious Disease

consultation requested because of pneumonia.



PAST MEDICAL HISTORY:  As above, history of bronchial asthma, bronchitis,

arthritis, coronary artery disease, congestive heart failure, diabetes,

hypertension, hypercholesterolemia, hypothyroidism, osteoporosis, and

peripheral edema.  Denies history of atrial fibrillation, mitral valve

prolapse, or chronic kidney disease.



PAST SURGICAL HISTORY:  Denies pacemaker insertion.



FAMILY HISTORY:  Unremarkable.  No known family history.



SOCIAL HISTORY:  Denies use of tobacco, smoking, alcohol use, or any

substance abuse.



IMMUNIZATIONS:  She is up to date on influenza vaccination.  History of

pneumococcal vaccination, not up to date.  Tetanus toxoid vaccination,

none.



Presently, states she was having some chills this morning.



REVIEW OF SYSTEMS:

CONSTITUTIONAL:   She is weak, generalized.  Denies any headaches.  Denies

any fever, but had chills this morning

GASTROINTESTINAL:  Unremarkable.  No history of diarrhea or obstipation.

GENITOURINARY:  Unremarkable.  No dysuria.  No hematuria.  No history of

kidney stones.

RESPIRATORY:  Complains of some shortness of breath, cough, and wheezing. 

Denies much expectorate.  Denies any hemoptysis or chest pain.

CENTRAL NERVOUS SYSTEM:  Denies any headache, weakness or any history of

seizures.



MEDICATIONS:  As per chart reviewed, patient presently on Zithromax,

started this morning 500 mg once a day daily.  Rest of the medications

reviewed as per chart.



PHYSICAL EXAMINATION:

GENERAL:  Patient is awake, alert, not in any acute distress.

VITAL SIGNS:  Afebrile.  Blood pressure 161/70; respirations 20; pulse of

82 and pulse ox is 95%.

HEENT:  Pupils equal, reactive to light and accommodation.  Extraocular

movements are full.  Fundus is negative.  Sclerae nonicteric.  Conjunctiva

normal.  JVP not elevated.

LUNGS:  Bilateral rhonchi and expiratory wheeze.

CARDIOVASCULAR SYSTEM:  S1, S2 regular.  No murmur or gallop.

ABDOMEN:  Soft, obese.  Bowel sounds present.  Nontender.  No organomegaly

appreciated.

EXTREMITIES:  No cyanosis, clubbing, or edema.  No calf tenderness

elicited.



LABORATORY DATA: WBC 8.0, hemoglobin 11.9, hematocrit 35.2, platelets

187,000.  Creatinine of 0.8, BUN of 12.  Sodium 131.  Patient's chest x-ray

reviewed.  Patient got CT chest this evening on 01/24/2018, initial report

noted multifocal ground-glass opacities, upper lobe with some areas of

suzy consolidation, right upper lobe consistent with pneumonia. 

Interstitial fibrotic changes of both lung bases.  Some improvement in

lower lung base infiltrates.  Cardiomegaly.  Patient also had an MRI done

on 01/22/2018, which shows no intracranial pathology.  Chronic

microangiopathic changes.



IMPRESSION:

1.  Bilateral pneumonia, rule out community-acquired pneumonia (CAP) versus

atypical pneumonia.

2.  Exacerbation of bronchial asthma.

3.  Status post fall.

4.  Diabetes mellitus.

5.  Hypertension.

6.  Coronary artery disease with history of congestive heart disease.



PLAN:

1.  Pan cultures.

2.  We will check atypical titers for Mycoplasma, Legionella.

3.  Sputum Gram stain and culture.

4.  Start IV Rocephin 1 g q.12 hourly for broader Gram-positive and

Gram-negative coverage.

5.  Continue IV Zithromax 500 mg once a day daily, initiated on 01/24/2018.

6.  We will get sedimentation rate, C-reactive proteins.

7.  Check proBNP.

8.  Check procalcitonin levels.



Diuresis as per PMD.  We will follow along with you and await official

report of the CT of the chest. We will discuss with private MD.  Case

discussed with the staff and nurse practitioner.  We will hold of any

discharge today and follow blood cultures as well as sputum cultures and CT

of the chest.



Thank you very much for allowing me to participate in the care of your

patient.  We will follow along with you while the patient is in the

hospital.











__________________________________________

Pa Gaitan MD



DD:  01/24/2018 21:25:13

DT:  01/25/2018 0:53:49

Job # [id number]

## 2018-01-25 NOTE — CP.PCM.PN
Subjective





- Date & Time of Evaluation


Date of Evaluation: 01/25/18


Time of Evaluation: 13:51





- Subjective


Subjective: 





CHIEF COMPLAINTS TODAY :


c/o dry cough


unable to expectorate.


ct chest w/o contrast  noted. 


B/L UPPER LOBE GROUNDGLASS OPACTIES C AREAS OF CONSOLIDATION RUL- PNEUMONIA ( 

see full report )





ROS.


HEENT :  N.


Resp :       +ve wheezing ,pleuritic CP ,or hemoptysis 


Cardio :     No anginal  CP, PND, orthopnea, palpitation 


GI :           No abd.pain, n/v ,diarrhea or GI bleeding .


CNS : No headache, vertigo, focal deficit.


Musculoskel :  No joint swelling ,


Derm :        No rash 


Psych :     Normal affect.


Ext :  No  swelling ,calf pain 





PE.


Pt. is alert awake in no distress.


V.S  As noted in the chart 


Head ,ear nose,throat and eyes : Normal.


Neck : Supple with normal carotids.


Lungs:  B/L RHONCHI /EXPIRATORY WHEEZE.


Heart : S1 & S2 normal with S4. No murmur.


Abd : Soft non tender with normal bowel sounds.


Neuro : Moves all ext. with no localized deficit.


Ext : No edema with intact pulses.Non tender calves 


Derm : No rashes or decubitus ulcer.





LABS/RADIOLOGY: 


CT CHEST GHAZAL APICAL PNEUMONIA .


ESR >100





PLAN;


CONTINUE IV ROCEPHIN 1GM IV Q12 [facility].  1/24/18.


CONTINUE IV ZITHROMAX 500MG IV [id number] . 1/24/18 


?TB W/U.


PULMONARY CONSULT R/O TB.





Objective





- Vital Signs/Intake and Output


Vital Signs (last 24 hours): 


 











Temp Pulse Resp BP Pulse Ox


 


 98 F   68   20   131/85   96 


 


 01/25/18 08:21  01/25/18 08:21  01/25/18 08:21  01/25/18 08:21  01/25/18 08:21











- Medications


Medications: 


 Current Medications





Albuterol/Ipratropium (Duoneb 3 Mg/0.5 Mg (3 Ml) Ud)  3 ml INH RQ6 [facility]


   Last Admin: 01/25/18 09:18 Dose:  3 ml


Aspirin (Aspirin Chewable)  81 mg PO DAILY [facility]


   Last Admin: 01/25/18 10:45 Dose:  81 mg


Cilostazol (Pletal)  50 mg PO BID [facility]


   Last Admin: 01/25/18 10:47 Dose:  50 mg


Gabapentin (Neurontin)  300 mg PO DAILY [facility]


   Last Admin: 01/25/18 10:44 Dose:  300 mg


Heparin Sodium (Porcine) (Heparin)  5,000 units SC Q12 [facility]


   Last Admin: 01/22/18 21:33 Dose:  5,000 units


Home Med (Patient's Own Drops)  2 drop OU BID [facility]


   Last Admin: 01/25/18 10:51 Dose:  2 drop


Azithromycin 500 mg/ Dextrose  250 mls @ 167 mls/hr IVPB Q24H [facility]


   Last Admin: 01/24/18 18:30 Dose:  167 mls/hr


Ceftriaxone Sodium 1 gm/ (Sodium Chloride)  100 mls @ 50 mls/30 min IVPB Q12H 

[facility]


   Last Admin: 01/25/18 01:51 Dose:  50 mls/30 min


Insulin Human Regular (Novolin R)  0 unit SC ACHS [facility]


   PRN Reason: Protocol


   Last Admin: 01/25/18 12:27 Dose:  3 unit


Losartan Potassium (Cozaar)  100 mg PO DAILY [facility]


   Last Admin: 01/25/18 10:45 Dose:  100 mg


Metoprolol Tartrate (Lopressor)  50 mg PO BID [facility]


   Last Admin: 01/25/18 10:45 Dose:  50 mg











- Labs


Labs: 


 





 01/24/18 08:02 





 01/24/18 08:02 





 











PT  13.0 SECONDS (9.7-12.2)  H  01/22/18  13:24    


 


INR  1.2   01/22/18  13:24    


 


APTT  27 SECONDS (21-34)   01/22/18  13:24

## 2018-01-26 RX ADMIN — HUMAN INSULIN SCH: 100 INJECTION, SOLUTION SUBCUTANEOUS at 21:36

## 2018-01-26 RX ADMIN — HUMAN INSULIN SCH UNIT: 100 INJECTION, SOLUTION SUBCUTANEOUS at 12:30

## 2018-01-26 RX ADMIN — HUMAN INSULIN SCH UNIT: 100 INJECTION, SOLUTION SUBCUTANEOUS at 16:30

## 2018-01-26 RX ADMIN — CILOSTAZOL SCH MG: 50 TABLET ORAL at 09:50

## 2018-01-26 RX ADMIN — HUMAN INSULIN SCH: 100 INJECTION, SOLUTION SUBCUTANEOUS at 07:33

## 2018-01-26 RX ADMIN — IPRATROPIUM BROMIDE AND ALBUTEROL SULFATE SCH ML: .5; 3 SOLUTION RESPIRATORY (INHALATION) at 21:34

## 2018-01-26 RX ADMIN — IPRATROPIUM BROMIDE AND ALBUTEROL SULFATE SCH ML: .5; 3 SOLUTION RESPIRATORY (INHALATION) at 01:17

## 2018-01-26 RX ADMIN — CILOSTAZOL SCH MG: 50 TABLET ORAL at 17:44

## 2018-01-26 RX ADMIN — IPRATROPIUM BROMIDE AND ALBUTEROL SULFATE SCH ML: .5; 3 SOLUTION RESPIRATORY (INHALATION) at 08:59

## 2018-01-26 RX ADMIN — IPRATROPIUM BROMIDE AND ALBUTEROL SULFATE SCH: .5; 3 SOLUTION RESPIRATORY (INHALATION) at 14:00

## 2018-01-26 NOTE — CP.PCM.PN
Subjective





- Date & Time of Evaluation


Date of Evaluation: 01/26/18


Time of Evaluation: 13:24





- Subjective


Subjective: 





CHIEF COMPLAINTS TODAY :


GEN WEAKNESS , WALKS WITH ASSISTANCE


CXR  ? PNEUMONIA  





ROS.


HEENT :  N.


Resp :       No  wheezing ,pleuritic CP ,or hemoptysis 


Cardio :     No anginal  CP, PND, orthopnea, palpitation 


GI :           No abd.pain, n/v ,diarrhea or GI bleeding .


CNS : No headache, vertigo, focal deficit.


Musculoskel :  No joint swelling ,


Derm :        No rash 


Psych :     Normal affect.


Ext :  No  swelling ,calf pain 





PE.


Pt. is alert awake in no distress.


V.S  As noted in the chart 


Head ,ear nose,throat and eyes : Normal.


Neck : Supple with normal carotids.


Lungs: Clear air entry.


Heart : S1 & S2 normal with S4. No murmur.


Abd : Soft non tender with normal bowel sounds.


Neuro : Moves all ext. with no localized deficit.


Ext : No edema with intact pulses.Non tender calves 


Derm : No rashes or decubitus ulcer.





LABS/RADIOLOGY: ct chestBIL APICAL PNEUMONIA 





PLAN;


IV AB 


AWAITING AFB /PULM EVAL 





Objective





- Vital Signs/Intake and Output


Vital Signs (last 24 hours): 


 











Temp Pulse Resp BP Pulse Ox


 


 97.7 F   83   20   136/66   97 


 


 01/26/18 08:11  01/26/18 08:11  01/26/18 08:11  01/26/18 08:11  01/26/18 08:11








Intake and Output: 


 











 01/26/18 01/26/18





 11:59 23:59


 


Intake Total 100 


 


Balance 100 














- Medications


Medications: 


 Current Medications





Albuterol/Ipratropium (Duoneb 3 Mg/0.5 Mg (3 Ml) Ud)  3 ml INH RQ6 [facility]


   Last Admin: 01/26/18 08:59 Dose:  3 ml


Aspirin (Aspirin Chewable)  81 mg PO DAILY [facility]


   Last Admin: 01/26/18 09:50 Dose:  81 mg


Cilostazol (Pletal)  50 mg PO BID [facility]


   Last Admin: 01/26/18 09:50 Dose:  50 mg


Gabapentin (Neurontin)  300 mg PO DAILY [facility]


   Last Admin: 01/26/18 09:50 Dose:  300 mg


Heparin Sodium (Porcine) (Heparin)  5,000 units SC Q12 [facility]


   Last Admin: 01/22/18 21:33 Dose:  5,000 units


Home Med (Patient's Own Drops)  2 drop OU BID [facility]


   Last Admin: 01/25/18 18:26 Dose:  2 drop


Azithromycin 500 mg/ Dextrose  250 mls @ 167 mls/hr IVPB Q24H [facility]


   Last Admin: 01/25/18 18:27 Dose:  167 mls/hr


Ceftriaxone Sodium 1 gm/ (Sodium Chloride)  100 mls @ 50 mls/30 min IVPB Q12H 

[facility]


   Last Admin: 01/26/18 03:19 Dose:  50 mls/30 min


Insulin Human Regular (Novolin R)  0 unit SC ACHS [facility]


   PRN Reason: Protocol


   Last Admin: 01/26/18 12:30 Dose:  2 unit


Losartan Potassium (Cozaar)  100 mg PO DAILY [facility]


   Last Admin: 01/26/18 09:49 Dose:  100 mg


Metoprolol Tartrate (Lopressor)  50 mg PO BID [facility]


   Last Admin: 01/25/18 18:25 Dose:  50 mg











- Labs


Labs: 


 





 01/24/18 08:02 





 01/24/18 08:02 





 











PT  13.0 SECONDS (9.7-12.2)  H  01/22/18  13:24    


 


INR  1.2   01/22/18  13:24    


 


APTT  27 SECONDS (21-34)   01/22/18  13:24

## 2018-01-26 NOTE — CP.PCM.PN
Subjective





- Date & Time of Evaluation


Date of Evaluation: 01/26/18


Time of Evaluation: 22:26





- Subjective


Subjective: 





CHIEF COMPLAINTS TODAY :


AFEBRILE


Feeling better


SEEN BY PULMONARY AND APPRECIATED








ct chest w/o contrast  noted. 


B/L UPPER LOBE GROUNDGLASS OPACTIES C AREAS OF CONSOLIDATION RUL- PNEUMONIA ( 

see full report )





ROS.


HEENT :  N.


Resp :       +ve wheezing ,pleuritic CP ,or hemoptysis 


Cardio :     No anginal  CP, PND, orthopnea, palpitation 


GI :           No abd.pain, n/v ,diarrhea or GI bleeding .


CNS : No headache, vertigo, focal deficit.


Musculoskel :  No joint swelling ,


Derm :        No rash 


Psych :     Normal affect.


Ext :  No  swelling ,calf pain 





PE.


Pt. is alert awake in no distress.


V.S  As noted in the chart 


Head ,ear nose,throat and eyes : Normal.


Neck : Supple with normal carotids.


Lungs:  B/L RHONCHI /EXPIRATORY WHEEZE.


Heart : S1 & S2 normal with S4. No murmur.


Abd : Soft non tender with normal bowel sounds.


Neuro : Moves all ext. with no localized deficit.


Ext : No edema with intact pulses.Non tender calves 


Derm : No rashes or decubitus ulcer.





LABS/RADIOLOGY: 


CT CHEST GHAZAL APICAL PNEUMONIA .


ESR >100





PLAN;


CONTINUE IV ROCEPHIN 1GM IV Q12 HRLY.  1/24/18.


CONTINUE IV ZITHROMAX 500MG IV [id number] . 1/24/18 


TB W/U. IN PROGRESS


aWAIT qUANTIferon gOLD tb TEST


SPUTUM INDUCTION DAILY 3 SPUTUM'S.





PULMONARY CONSULT R/O TB. APPRECIATED.





Objective





- Vital Signs/Intake and Output


Vital Signs (last 24 hours): 


 











Temp Pulse Resp BP Pulse Ox


 


 97.9 F   94 H  20   147/76   99 


 


 01/26/18 16:00  01/26/18 16:00  01/26/18 16:00  01/26/18 16:00  01/26/18 16:00








Intake and Output: 


 











 01/26/18 01/27/18





 18:59 06:59


 


Intake Total 560 


 


Balance 560 














- Medications


Medications: 


 Current Medications





Albuterol/Ipratropium (Duoneb 3 Mg/0.5 Mg (3 Ml) Ud)  3 ml INH RQ6 [facility]


   Last Admin: 01/26/18 21:34 Dose:  3 ml


Aspirin (Aspirin Chewable)  81 mg PO DAILY [facility]


   Last Admin: 01/26/18 09:50 Dose:  81 mg


Cilostazol (Pletal)  50 mg PO BID [facility]


   Last Admin: 01/26/18 17:44 Dose:  50 mg


Gabapentin (Neurontin)  300 mg PO DAILY [facility]


   Last Admin: 01/26/18 09:50 Dose:  300 mg


Heparin Sodium (Porcine) (Heparin)  5,000 units SC Q12 [facility]


   Last Admin: 01/22/18 21:33 Dose:  5,000 units


Home Med (Patient's Own Drops)  2 drop OU BID [facility]


   Last Admin: 01/26/18 17:45 Dose:  2 drop


Azithromycin 500 mg/ Dextrose  250 mls @ 167 mls/hr IVPB Q24H [facility]


   Last Admin: 01/26/18 17:43 Dose:  167 mls/hr


Ceftriaxone Sodium 1 gm/ (Sodium Chloride)  100 mls @ 50 mls/30 min IVPB Q12H 

[facility]


   Last Admin: 01/26/18 14:00 Dose:  50 mls/30 min


Insulin Human Regular (Novolin R)  0 unit SC ACHS [facility]


   PRN Reason: Protocol


   Last Admin: 01/26/18 21:36 Dose:  Not Given


Losartan Potassium (Cozaar)  100 mg PO DAILY [facility]


   Last Admin: 01/26/18 09:49 Dose:  100 mg


Metoprolol Tartrate (Lopressor)  50 mg PO BID [facility]


   Last Admin: 01/26/18 17:38 Dose:  50 mg











- Labs


Labs: 


 





 01/24/18 08:02 





 01/24/18 08:02 





 











PT  13.0 SECONDS (9.7-12.2)  H  01/22/18  13:24    


 


INR  1.2   01/22/18  13:24    


 


APTT  27 SECONDS (21-34)   01/22/18  13:24

## 2018-01-26 NOTE — CP.PCM.CON
History of Present Illness





- History of Present Illness


History of Present Illness: 





83 y/o female who presented with lethargy, fatigue and dyspnea. The patient is 

found to have upperlobe infiltrates and some chronic changes. Pulmonary consult 

called for further evaluation. Pt reports her symptoms to be present for 1 

week. She denies fever, chills, night sweats, weight loss or hemoptysis. 





Review of Systems





- Review of Systems


All systems: reviewed and no additional remarkable complaints except (See HPI, 

rest negative)





Past Patient History





- Past Medical History & Family History


Past Medical History?: Yes





- Past Social History


Smoking Status: Never Smoked





- CARDIAC


Hx Atrial Fibrillation: No


Hx Congestive Heart Failure: Yes


Hx Hypercholesterolemia: Yes


Hx Hypertension: Yes


Hx Mitral Valve Prolapse: No


Hx Pacemaker: No


Hx Peripheral Edema: Yes





- PULMONARY


Hx Asthma: Yes


Hx Bronchitis: Yes


Hx Chronic Obstructive Pulmonary Disease (COPD): Yes





- NEUROLOGICAL


Hx Neurological Disorder: No


Hx Dizziness: Yes





- HEENT


Hx HEENT Problems: Yes


Hx Cataracts: Yes (cataract surgery 3 years ago?)


Hx Glaucoma: No


Other/Comment: wear eyeglasses





- RENAL


Hx Chronic Kidney Disease: No





- ENDOCRINE/METABOLIC


Hx Hypothyroidism: Yes





- HEMATOLOGICAL/ONCOLOGICAL


Hx Blood Disorders: No





- INTEGUMENTARY


Hx Dermatological Problems: No





- MUSCULOSKELETAL/RHEUMATOLOGICAL


Hx Arthritis: Yes


Hx Osteoporosis: Yes





- GASTROINTESTINAL


Hx Gastrointestinal Disorders: No





- GENITOURINARY/GYNECOLOGICAL


Hx Genitourinary Disorders: No





- PSYCHIATRIC


Hx Substance Use: No





- SURGICAL HISTORY


Hx Surgeries: Yes


Hx Orthopedic Surgery: Yes (left arm surgery 8 yrs ago)





- ANESTHESIA


Hx Anesthesia: Yes


Hx Anesthesia Reactions: No


Hx Malignant Hyperthermia: No





Meds


Allergies/Adverse Reactions: 


 Allergies











Allergy/AdvReac Type Severity Reaction Status Date / Time


 


No Known Allergies Allergy   Verified 01/22/18 12:21














- Medications


Medications: 


 Current Medications





Albuterol/Ipratropium (Duoneb 3 Mg/0.5 Mg (3 Ml) Ud)  3 ml INH RQ6 [facility]


   Last Admin: 01/26/18 08:59 Dose:  3 ml


Aspirin (Aspirin Chewable)  81 mg PO DAILY [facility]


   Last Admin: 01/26/18 09:50 Dose:  81 mg


Cilostazol (Pletal)  50 mg PO BID [facility]


   Last Admin: 01/26/18 09:50 Dose:  50 mg


Gabapentin (Neurontin)  300 mg PO DAILY [facility]


   Last Admin: 01/26/18 09:50 Dose:  300 mg


Heparin Sodium (Porcine) (Heparin)  5,000 units SC Q12 [facility]


   Last Admin: 01/22/18 21:33 Dose:  5,000 units


Home Med (Patient's Own Drops)  2 drop OU BID [facility]


   Last Admin: 01/26/18 10:00 Dose:  2 drop


Azithromycin 500 mg/ Dextrose  250 mls @ 167 mls/hr IVPB Q24H [facility]


   Last Admin: 01/25/18 18:27 Dose:  167 mls/hr


Ceftriaxone Sodium 1 gm/ (Sodium Chloride)  100 mls @ 50 mls/30 min IVPB Q12H 

[facility]


   Last Admin: 01/26/18 14:00 Dose:  50 mls/30 min


Insulin Human Regular (Novolin R)  0 unit SC ACHS [facility]


   PRN Reason: Protocol


   Last Admin: 01/26/18 12:30 Dose:  2 unit


Losartan Potassium (Cozaar)  100 mg PO DAILY [facility]


   Last Admin: 01/26/18 09:49 Dose:  100 mg


Metoprolol Tartrate (Lopressor)  50 mg PO BID [facility]


   Last Admin: 01/25/18 18:25 Dose:  50 mg











Physical Exam





- Head Exam


Head Exam: NORMAL INSPECTION





- Eye Exam


Eye Exam: Normal appearance





- ENT Exam


ENT Exam: Mucous Membranes Moist





- Respiratory Exam


Respiratory Exam: Clear to Auscultation Bilateral, NORMAL BREATHING PATTERN





- Cardiovascular Exam


Cardiovascular Exam: REGULAR RHYTHM, +S1, +S2





- GI/Abdominal Exam


GI & Abdominal Exam: Normal Bowel Sounds, Soft





- Extremities Exam


Extremities exam: Positive for: normal inspection





Results





- Vital Signs


Recent Vital Signs: 


 Last Vital Signs











Temp  97.7 F   01/26/18 08:11


 


Pulse  83   01/26/18 08:11


 


Resp  20   01/26/18 08:11


 


BP  136/66   01/26/18 08:11


 


Pulse Ox  97   01/26/18 08:11














- Labs


Result Diagrams: 


 01/24/18 08:02





 01/24/18 08:02


Labs: 


 Laboratory Results - last 24 hr











  01/25/18 01/25/18 01/26/18





  16:08 21:09 07:05


 


POC Glucose (mg/dL)  215 H  184 H  129 H














  01/26/18





  11:15


 


POC Glucose (mg/dL)  226 H














Assessment & Plan





- Assessment and Plan (Free Text)


Assessment: 





Pneumonia


? Pulmonary TB


COPD


CHF





Agree with 


Respiratory isolation


Ceftrioxone and zithromax


AFB's


Gold QUENTIFERON





Will evaluate for further w/u based on above tests.





Consider Lasix


2D Echo to evaluate cardiac function, if not done recently.

## 2018-01-27 RX ADMIN — HUMAN INSULIN SCH: 100 INJECTION, SOLUTION SUBCUTANEOUS at 22:32

## 2018-01-27 RX ADMIN — CILOSTAZOL SCH MG: 50 TABLET ORAL at 17:43

## 2018-01-27 RX ADMIN — HUMAN INSULIN SCH UNIT: 100 INJECTION, SOLUTION SUBCUTANEOUS at 17:51

## 2018-01-27 RX ADMIN — IPRATROPIUM BROMIDE AND ALBUTEROL SULFATE SCH ML: .5; 3 SOLUTION RESPIRATORY (INHALATION) at 20:51

## 2018-01-27 RX ADMIN — IPRATROPIUM BROMIDE AND ALBUTEROL SULFATE SCH: .5; 3 SOLUTION RESPIRATORY (INHALATION) at 21:03

## 2018-01-27 RX ADMIN — IPRATROPIUM BROMIDE AND ALBUTEROL SULFATE SCH ML: .5; 3 SOLUTION RESPIRATORY (INHALATION) at 08:34

## 2018-01-27 RX ADMIN — CILOSTAZOL SCH MG: 50 TABLET ORAL at 11:12

## 2018-01-27 RX ADMIN — IPRATROPIUM BROMIDE AND ALBUTEROL SULFATE SCH: .5; 3 SOLUTION RESPIRATORY (INHALATION) at 01:51

## 2018-01-27 RX ADMIN — HUMAN INSULIN SCH: 100 INJECTION, SOLUTION SUBCUTANEOUS at 11:30

## 2018-01-27 RX ADMIN — HUMAN INSULIN SCH: 100 INJECTION, SOLUTION SUBCUTANEOUS at 08:08

## 2018-01-27 RX ADMIN — IPRATROPIUM BROMIDE AND ALBUTEROL SULFATE SCH ML: .5; 3 SOLUTION RESPIRATORY (INHALATION) at 14:42

## 2018-01-27 NOTE — CP.PCM.PN
Subjective





- Date & Time of Evaluation


Date of Evaluation: 01/27/18


Time of Evaluation: 21:21





- Subjective


Subjective: 





CHIEF COMPLAINTS TODAY :


AFEBRILE


Feeling better





PATIENT STATES HAS NO SPUTUM








ct chest w/o contrast  noted. 


B/L UPPER LOBE GROUNDGLASS OPACTIES C AREAS OF CONSOLIDATION RUL- PNEUMONIA ( 

see full report )





ROS.


HEENT :  N.


Resp :       +ve wheezing ,pleuritic CP ,or hemoptysis 


Cardio :     No anginal  CP, PND, orthopnea, palpitation 


GI :           No abd.pain, n/v ,diarrhea or GI bleeding .


CNS : No headache, vertigo, focal deficit.


Musculoskel :  No joint swelling ,


Derm :        No rash 


Psych :     Normal affect.


Ext :  No  swelling ,calf pain 





PE.


Pt. is alert awake in no distress.


V.S  As noted in the chart 


Head ,ear nose,throat and eyes : Normal.


Neck : Supple with normal carotids.


Lungs:  B/L RHONCHI /EXPIRATORY WHEEZE.


Heart : S1 & S2 normal with S4. No murmur.


Abd : Soft non tender with normal bowel sounds.


Neuro : Moves all ext. with no localized deficit.


Ext : No edema with intact pulses.Non tender calves 


Derm : No rashes or decubitus ulcer.





LABS/RADIOLOGY: 


CT CHEST GHAZAL APICAL PNEUMONIA .


ESR >100





PLAN;


CONTINUE IV ROCEPHIN 1GM IV Q12 HRLY.  1/24/18.


CONTINUE IV ZITHROMAX 500MG IV [id number] . 1/24/18 


TB W/U. IN PROGRESS


aWAIT QUANTIFERON GOLD tb TEST


SPUTUM INDUCTION DAILY 3 SPUTUM'S.-P








Objective





- Vital Signs/Intake and Output


Vital Signs (last 24 hours): 


 











Temp Pulse Resp BP Pulse Ox


 


 97.4 F L  85   20   122/70   95 


 


 01/27/18 08:00  01/27/18 08:00  01/27/18 08:00  01/27/18 08:00  01/27/18 08:00











- Medications


Medications: 


 Current Medications





Albuterol/Ipratropium (Duoneb 3 Mg/0.5 Mg (3 Ml) Ud)  3 ml INH RQ6 [facility]


   Last Admin: 01/27/18 21:03 Dose:  Not Given


Aspirin (Aspirin Chewable)  81 mg PO DAILY [facility]


   Last Admin: 01/27/18 11:04 Dose:  81 mg


Cilostazol (Pletal)  50 mg PO BID [facility]


   Last Admin: 01/27/18 17:43 Dose:  50 mg


Gabapentin (Neurontin)  300 mg PO DAILY [facility]


   Last Admin: 01/27/18 11:03 Dose:  300 mg


Heparin Sodium (Porcine) (Heparin)  5,000 units SC Q12 [facility]


   Last Admin: 01/22/18 21:33 Dose:  5,000 units


Home Med (Patient's Own Drops)  2 drop OU BID [facility]


   Last Admin: 01/27/18 17:49 Dose:  2 drop


Azithromycin 500 mg/ Dextrose  250 mls @ 167 mls/hr IVPB Q24H [facility]


   Last Admin: 01/27/18 17:52 Dose:  167 mls/hr


Ceftriaxone Sodium 1 gm/ (Sodium Chloride)  100 mls @ 50 mls/30 min IVPB Q12H 

[facility]


   Last Admin: 01/27/18 14:20 Dose:  50 mls/30 min


Insulin Human Regular (Novolin R)  0 unit SC ACHS [facility]


   PRN Reason: Protocol


   Last Admin: 01/27/18 17:51 Dose:  2 unit


Losartan Potassium (Cozaar)  100 mg PO DAILY [facility]


   Last Admin: 01/27/18 11:03 Dose:  100 mg


Metoprolol Tartrate (Lopressor)  50 mg PO BID [facility]


   Last Admin: 01/27/18 17:45 Dose:  50 mg











- Labs


Labs: 


 





 01/24/18 08:02 





 01/24/18 08:02 





 











PT  13.0 SECONDS (9.7-12.2)  H  01/22/18  13:24    


 


INR  1.2   01/22/18  13:24    


 


APTT  27 SECONDS (21-34)   01/22/18  13:24

## 2018-01-27 NOTE — CP.PCM.PN
Subjective





- Date & Time of Evaluation


Date of Evaluation: 01/27/18


Time of Evaluation: 13:48





- Subjective


Subjective: 





CHIEF COMPLAINTS TODAY :


GEN WEAKNESS , WALKS WITH ASSISTANCE


DEPRESSED  





ROS.


HEENT :  N.


Resp :       No  wheezing ,pleuritic CP ,or hemoptysis 


Cardio :     No anginal  CP, PND, orthopnea, palpitation 


GI :           No abd.pain, n/v ,diarrhea or GI bleeding .


CNS : No headache, vertigo, focal deficit.


Musculoskel :  No joint swelling ,


Derm :        No rash 


Psych :     Normal affect.


Ext :  No  swelling ,calf pain 





PE.


Pt. is alert awake in no distress.


V.S  As noted in the chart 


Head ,ear nose,throat and eyes : Normal.


Neck : Supple with normal carotids.


Lungs: Clear air entry.


Heart : S1 & S2 normal with S4. No murmur.


Abd : Soft non tender with normal bowel sounds.


Neuro : Moves all ext. with no localized deficit.


Ext : No edema with intact pulses.Non tender calves 


Derm : No rashes or decubitus ulcer.





LABS/RADIOLOGY: ct chestBIL APICAL PNEUMONIA 





PLAN;


IV AB 


AWAITING TB W/U 





Objective





- Vital Signs/Intake and Output


Vital Signs (last 24 hours): 


 











Temp Pulse Resp BP Pulse Ox


 


 98.3 F   85   20   147/81   99 


 


 01/27/18 00:00  01/27/18 00:00  01/27/18 00:00  01/27/18 00:00  01/27/18 00:00








Intake and Output: 


 











 01/27/18 01/27/18





 11:59 23:59


 


Intake Total 350 


 


Balance 350 














- Medications


Medications: 


 Current Medications





Albuterol/Ipratropium (Duoneb 3 Mg/0.5 Mg (3 Ml) Ud)  3 ml INH RQ6 [facility]


   Last Admin: 01/27/18 08:34 Dose:  3 ml


Aspirin (Aspirin Chewable)  81 mg PO DAILY [facility]


   Last Admin: 01/27/18 11:04 Dose:  81 mg


Cilostazol (Pletal)  50 mg PO BID [facility]


   Last Admin: 01/27/18 11:12 Dose:  50 mg


Gabapentin (Neurontin)  300 mg PO DAILY [facility]


   Last Admin: 01/27/18 11:03 Dose:  300 mg


Heparin Sodium (Porcine) (Heparin)  5,000 units SC Q12 [facility]


   Last Admin: 01/22/18 21:33 Dose:  5,000 units


Home Med (Patient's Own Drops)  2 drop OU BID [facility]


   Last Admin: 01/27/18 11:12 Dose:  2 drop


Azithromycin 500 mg/ Dextrose  250 mls @ 167 mls/hr IVPB Q24H [facility]


   Last Admin: 01/26/18 17:43 Dose:  167 mls/hr


Ceftriaxone Sodium 1 gm/ (Sodium Chloride)  100 mls @ 50 mls/30 min IVPB Q12H 

[facility]


   Last Admin: 01/27/18 02:25 Dose:  50 mls/30 min


Insulin Human Regular (Novolin R)  0 unit SC ACHS [facility]


   PRN Reason: Protocol


   Last Admin: 01/27/18 08:08 Dose:  Not Given


Losartan Potassium (Cozaar)  100 mg PO DAILY [facility]


   Last Admin: 01/27/18 11:03 Dose:  100 mg


Metoprolol Tartrate (Lopressor)  50 mg PO BID [facility]


   Last Admin: 01/27/18 11:04 Dose:  50 mg











- Labs


Labs: 


 





 01/24/18 08:02 





 01/24/18 08:02 





 











PT  13.0 SECONDS (9.7-12.2)  H  01/22/18  13:24    


 


INR  1.2   01/22/18  13:24    


 


APTT  27 SECONDS (21-34)   01/22/18  13:24

## 2018-01-27 NOTE — CP.PCM.PN
Subjective





- Date & Time of Evaluation


Date of Evaluation: 01/27/18


Time of Evaluation: 12:20





Objective





- Vital Signs/Intake and Output


Vital Signs (last 24 hours): 


 











Temp Pulse Resp BP Pulse Ox


 


 98.3 F   85   20   147/81   99 


 


 01/27/18 00:00  01/27/18 00:00  01/27/18 00:00  01/27/18 00:00  01/27/18 00:00








Intake and Output: 


 











 01/27/18 01/27/18





 06:59 18:59


 


Intake Total 650 


 


Balance 650 














- Medications


Medications: 


 Current Medications





Albuterol/Ipratropium (Duoneb 3 Mg/0.5 Mg (3 Ml) Ud)  3 ml INH RQ6 [facility]


   Last Admin: 01/27/18 08:34 Dose:  3 ml


Aspirin (Aspirin Chewable)  81 mg PO DAILY [facility]


   Last Admin: 01/27/18 11:04 Dose:  81 mg


Cilostazol (Pletal)  50 mg PO BID [facility]


   Last Admin: 01/27/18 11:12 Dose:  50 mg


Gabapentin (Neurontin)  300 mg PO DAILY [facility]


   Last Admin: 01/27/18 11:03 Dose:  300 mg


Heparin Sodium (Porcine) (Heparin)  5,000 units SC Q12 [facility]


   Last Admin: 01/22/18 21:33 Dose:  5,000 units


Home Med (Patient's Own Drops)  2 drop OU BID [facility]


   Last Admin: 01/27/18 11:12 Dose:  2 drop


Azithromycin 500 mg/ Dextrose  250 mls @ 167 mls/hr IVPB Q24H [facility]


   Last Admin: 01/26/18 17:43 Dose:  167 mls/hr


Ceftriaxone Sodium 1 gm/ (Sodium Chloride)  100 mls @ 50 mls/30 min IVPB Q12H 

[facility]


   Last Admin: 01/27/18 02:25 Dose:  50 mls/30 min


Insulin Human Regular (Novolin R)  0 unit SC ACHS [facility]


   PRN Reason: Protocol


   Last Admin: 01/27/18 08:08 Dose:  Not Given


Losartan Potassium (Cozaar)  100 mg PO DAILY [facility]


   Last Admin: 01/27/18 11:03 Dose:  100 mg


Metoprolol Tartrate (Lopressor)  50 mg PO BID [facility]


   Last Admin: 01/27/18 11:04 Dose:  50 mg











- Labs


Labs: 


 





 01/24/18 08:02 





 01/24/18 08:02 





 











PT  13.0 SECONDS (9.7-12.2)  H  01/22/18  13:24    


 


INR  1.2   01/22/18  13:24    


 


APTT  27 SECONDS (21-34)   01/22/18  13:24

## 2018-01-28 LAB
ALBUMIN SERPL-MCNC: 3.5 G/DL (ref 3.5–5)
ALBUMIN/GLOB SERPL: 1 {RATIO} (ref 1–2.1)
ALT SERPL-CCNC: 35 U/L (ref 9–52)
AST SERPL-CCNC: 23 U/L (ref 14–36)
BASOPHILS # BLD AUTO: 0.1 K/UL (ref 0–0.2)
BASOPHILS NFR BLD: 0.6 % (ref 0–2)
BUN SERPL-MCNC: 9 MG/DL (ref 7–17)
CALCIUM SERPL-MCNC: 8.8 MG/DL (ref 8.6–10.4)
EOSINOPHIL # BLD AUTO: 0.2 K/UL (ref 0–0.7)
EOSINOPHIL NFR BLD: 2.7 % (ref 0–4)
ERYTHROCYTE [DISTWIDTH] IN BLOOD BY AUTOMATED COUNT: 14.7 % (ref 11.5–14.5)
GFR NON-AFRICAN AMERICAN: > 60
HGB BLD-MCNC: 11.3 G/DL (ref 11–16)
LYMPHOCYTES # BLD AUTO: 2 K/UL (ref 1–4.3)
LYMPHOCYTES NFR BLD AUTO: 25.5 % (ref 20–40)
MAGNESIUM SERPL-MCNC: 1.2 MG/DL (ref 1.6–2.3)
MCH RBC QN AUTO: 30.6 PG (ref 27–31)
MCHC RBC AUTO-ENTMCNC: 34.5 G/DL (ref 33–37)
MCV RBC AUTO: 88.7 FL (ref 81–99)
MONOCYTES # BLD: 0.8 K/UL (ref 0–0.8)
MONOCYTES NFR BLD: 9.4 % (ref 0–10)
NEUTROPHILS # BLD: 5 K/UL (ref 1.8–7)
NEUTROPHILS NFR BLD AUTO: 61.8 % (ref 50–75)
NRBC BLD AUTO-RTO: 0 % (ref 0–2)
PLATELET # BLD: 196 K/UL (ref 130–400)
PMV BLD AUTO: 8.9 FL (ref 7.2–11.7)
RBC # BLD AUTO: 3.69 MIL/UL (ref 3.8–5.2)
WBC # BLD AUTO: 8 K/UL (ref 4.8–10.8)

## 2018-01-28 RX ADMIN — CILOSTAZOL SCH MG: 50 TABLET ORAL at 09:44

## 2018-01-28 RX ADMIN — CILOSTAZOL SCH MG: 50 TABLET ORAL at 17:14

## 2018-01-28 RX ADMIN — HUMAN INSULIN SCH: 100 INJECTION, SOLUTION SUBCUTANEOUS at 11:30

## 2018-01-28 RX ADMIN — IPRATROPIUM BROMIDE AND ALBUTEROL SULFATE SCH: .5; 3 SOLUTION RESPIRATORY (INHALATION) at 20:14

## 2018-01-28 RX ADMIN — HUMAN INSULIN SCH: 100 INJECTION, SOLUTION SUBCUTANEOUS at 21:51

## 2018-01-28 RX ADMIN — IPRATROPIUM BROMIDE AND ALBUTEROL SULFATE SCH ML: .5; 3 SOLUTION RESPIRATORY (INHALATION) at 14:11

## 2018-01-28 RX ADMIN — IPRATROPIUM BROMIDE AND ALBUTEROL SULFATE SCH ML: .5; 3 SOLUTION RESPIRATORY (INHALATION) at 08:19

## 2018-01-28 RX ADMIN — HUMAN INSULIN SCH UNIT: 100 INJECTION, SOLUTION SUBCUTANEOUS at 16:30

## 2018-01-28 RX ADMIN — IPRATROPIUM BROMIDE AND ALBUTEROL SULFATE SCH: .5; 3 SOLUTION RESPIRATORY (INHALATION) at 01:58

## 2018-01-28 RX ADMIN — HUMAN INSULIN SCH: 100 INJECTION, SOLUTION SUBCUTANEOUS at 07:30

## 2018-01-28 NOTE — CP.PCM.PN
Subjective





- Date & Time of Evaluation


Date of Evaluation: 01/28/18


Time of Evaluation: 18:50





- Subjective


Subjective: 





Pt is seen and examined


AFB X 2 are negative





Objective





- Vital Signs/Intake and Output


Vital Signs (last 24 hours): 


 











Temp Pulse Resp BP Pulse Ox


 


 97.3 F L  90   20   121/59 L  97 


 


 01/28/18 16:00  01/28/18 16:00  01/28/18 16:00  01/28/18 16:00  01/28/18 16:00








Intake and Output: 


 











 01/28/18 01/28/18





 06:59 18:59


 


Intake Total 550 220


 


Output Total 400 


 


Balance 150 220














- Medications


Medications: 


 Current Medications





Albuterol/Ipratropium (Duoneb 3 Mg/0.5 Mg (3 Ml) Ud)  3 ml INH RQ6 [facility]


   Last Admin: 01/28/18 14:11 Dose:  3 ml


Aspirin (Aspirin Chewable)  81 mg PO DAILY [facility]


   Last Admin: 01/28/18 09:42 Dose:  81 mg


Cilostazol (Pletal)  50 mg PO BID [facility]


   Last Admin: 01/28/18 17:14 Dose:  50 mg


Gabapentin (Neurontin)  300 mg PO DAILY [facility]


   Last Admin: 01/28/18 09:42 Dose:  300 mg


Heparin Sodium (Porcine) (Heparin)  5,000 units SC Q12 [facility]


   Last Admin: 01/22/18 21:33 Dose:  5,000 units


Home Med (Patient's Own Drops)  2 drop OU BID [facility]


   Last Admin: 01/28/18 17:13 Dose:  2 drop


Azithromycin 500 mg/ Dextrose  250 mls @ 167 mls/hr IVPB Q24H [facility]


   Last Admin: 01/28/18 17:18 Dose:  167 mls/hr


Ceftriaxone Sodium 1 gm/ (Sodium Chloride)  100 mls @ 50 mls/30 min IVPB Q12H 

[facility]


   Last Admin: 01/28/18 14:43 Dose:  50 mls/30 min


Insulin Human Regular (Novolin R)  0 unit SC ACHS [facility]


   PRN Reason: Protocol


   Last Admin: 01/28/18 16:30 Dose:  2 unit


Losartan Potassium (Cozaar)  100 mg PO DAILY [facility]


   Last Admin: 01/28/18 09:42 Dose:  100 mg


Metoprolol Tartrate (Lopressor)  50 mg PO BID [facility]


   Last Admin: 01/28/18 17:10 Dose:  50 mg











- Labs


Labs: 


 





 01/28/18 07:15 





 01/28/18 07:15 





 











PT  13.0 SECONDS (9.7-12.2)  H  01/22/18  13:24    


 


INR  1.2   01/22/18  13:24    


 


APTT  27 SECONDS (21-34)   01/22/18  13:24    














- Head Exam


Head Exam: NORMAL INSPECTION





- Eye Exam


Eye Exam: Normal appearance





- ENT Exam


ENT Exam: Mucous Membranes Moist





- Respiratory Exam


Respiratory Exam: Rhonchi





- Cardiovascular Exam


Cardiovascular Exam: REGULAR RHYTHM, +S1, +S2





- GI/Abdominal Exam


GI & Abdominal Exam: Soft, Normal Bowel Sounds





Assessment and Plan





- Assessment and Plan (Free Text)


Assessment: 





Pneumonia


? Pulmonary TB


COPD


CHF





Awaiting 3rd AFB


Respiratory isolation


Ceftrioxone and zithromax


Awaiting QuantiFERON Gold


Will consider for bronch if QuantiFERON Gold is positive

## 2018-01-28 NOTE — CP.PCM.PN
Subjective





- Date & Time of Evaluation


Date of Evaluation: 01/28/18


Time of Evaluation: 22:09





- Subjective


Subjective: 





CHIEF COMPLAINTS TODAY :


AFEBRILE


less COUGH.


SPUTUM  - VE X 2 FOR AFB SMEAR.








ct chest w/o contrast  noted. 


B/L UPPER LOBE GROUNDGLASS OPACTIES C AREAS OF CONSOLIDATION RUL- PNEUMONIA ( 

see full report )





ROS.


HEENT :  N.


Resp :       +ve wheezing ,pleuritic CP ,or hemoptysis 


Cardio :     No anginal  CP, PND, orthopnea, palpitation 


GI :           No abd.pain, n/v ,diarrhea or GI bleeding .


CNS : No headache, vertigo, focal deficit.


Musculoskel :  No joint swelling ,


Derm :        No rash 


Psych :     Normal affect.


Ext :  No  swelling ,calf pain 





PE.


Pt. is alert awake in no distress.


V.S  As noted in the chart 


Head ,ear nose,throat and eyes : Normal.


Neck : Supple with normal carotids.


Lungs:  B/L RHONCHI / LESS  WHEEZE.


Heart : S1 & S2 normal with S4. No murmur.


Abd : Soft non tender with normal bowel sounds.


Neuro : Moves all ext. with no localized deficit.


Ext : No edema with intact pulses.Non tender calves 


Derm : No rashes or decubitus ulcer.





LABS/RADIOLOGY: 


CT CHEST GHAZAL APICAL PNEUMONIA .


ESR  105


1/28/18 LABS NOTED.





PLAN;


CONTINUE IV ROCEPHIN 1GM IV Q12 HRLY.  1/24/18.


CONTINUE IV ZITHROMAX 500MG IV [id number] . 1/24/18 


TB W/U. IN PROGRESS


aWAIT QUANTIFERON GOLD tb TEST


SPUTUM INDUCTION DAILY 3 SPUTUM'S.-P


PULMONARY TOILET.





Objective





- Vital Signs/Intake and Output


Vital Signs (last 24 hours): 


 











Temp Pulse Resp BP Pulse Ox


 


 97.3 F L  90   20   121/59 L  97 


 


 01/28/18 16:00  01/28/18 16:00  01/28/18 16:00  01/28/18 16:00  01/28/18 16:00








Intake and Output: 


 











 01/28/18 01/29/18





 18:59 06:59


 


Intake Total 220 


 


Balance 220 














- Medications


Medications: 


 Current Medications





Albuterol/Ipratropium (Duoneb 3 Mg/0.5 Mg (3 Ml) Ud)  3 ml INH RQ6 HUBER


   Last Admin: 01/28/18 20:14 Dose:  Not Given


Aspirin (Aspirin Chewable)  81 mg PO DAILY [facility]


   Last Admin: 01/28/18 09:42 Dose:  81 mg


Cilostazol (Pletal)  50 mg PO BID [facility]


   Last Admin: 01/28/18 17:14 Dose:  50 mg


Gabapentin (Neurontin)  300 mg PO DAILY [facility]


   Last Admin: 01/28/18 09:42 Dose:  300 mg


Heparin Sodium (Porcine) (Heparin)  5,000 units SC Q12 [facility]


   Last Admin: 01/22/18 21:33 Dose:  5,000 units


Home Med (Patient's Own Drops)  2 drop OU BID [facility]


   Last Admin: 01/28/18 17:13 Dose:  2 drop


Azithromycin 500 mg/ Dextrose  250 mls @ 167 mls/hr IVPB Q24H [facility]


   Last Admin: 01/28/18 17:18 Dose:  167 mls/hr


Ceftriaxone Sodium 1 gm/ (Sodium Chloride)  100 mls @ 50 mls/30 min IVPB Q12H 

[facility]


   Last Admin: 01/28/18 14:43 Dose:  50 mls/30 min


Insulin Human Regular (Novolin R)  0 unit SC ACHS [facility]


   PRN Reason: Protocol


   Last Admin: 01/28/18 21:51 Dose:  Not Given


Losartan Potassium (Cozaar)  100 mg PO DAILY [facility]


   Last Admin: 01/28/18 09:42 Dose:  100 mg


Metoprolol Tartrate (Lopressor)  50 mg PO BID [facility]


   Last Admin: 01/28/18 17:10 Dose:  50 mg











- Labs


Labs: 


 





 01/28/18 07:15 





 01/28/18 07:15 





 











PT  13.0 SECONDS (9.7-12.2)  H  01/22/18  13:24    


 


INR  1.2   01/22/18  13:24    


 


APTT  27 SECONDS (21-34)   01/22/18  13:24

## 2018-01-28 NOTE — CP.PCM.PN
Subjective





- Date & Time of Evaluation


Date of Evaluation: 01/28/18


Time of Evaluation: 14:48





- Subjective


Subjective: 





CHIEF COMPLAINTS TODAY :


GEN WEAKNESS , WALKS WITH ASSISTANCE


DEPRESSED  





ROS.


HEENT :  N.


Resp :       No  wheezing ,pleuritic CP ,or hemoptysis 


Cardio :     No anginal  CP, PND, orthopnea, palpitation 


GI :           No abd.pain, n/v ,diarrhea or GI bleeding .


CNS : No headache, vertigo, focal deficit.


Musculoskel :  No joint swelling ,


Derm :        No rash 


Psych :     Normal affect.


Ext :  No  swelling ,calf pain 





PE.


Pt. is alert awake in no distress.


V.S  As noted in the chart 


Head ,ear nose,throat and eyes : Normal.


Neck : Supple with normal carotids.


Lungs: Clear air entry.


Heart : S1 & S2 normal with S4. No murmur.


Abd : Soft non tender with normal bowel sounds.


Neuro : Moves all ext. with no localized deficit.


Ext : No edema with intact pulses.Non tender calves 


Derm : No rashes or decubitus ulcer.





LABS/RADIOLOGY: ct chestBIL APICAL PNEUMONIA 





PLAN;


IV AB 


AWAITING TB W/U 








Objective





- Vital Signs/Intake and Output


Vital Signs (last 24 hours): 


 











Temp Pulse Resp BP Pulse Ox


 


 97.5 F L  72   20   126/61   98 


 


 01/28/18 00:00  01/28/18 00:00  01/28/18 00:00  01/28/18 00:00  01/28/18 00:00








Intake and Output: 


 











 01/28/18 01/28/18





 11:59 23:59


 


Intake Total 220 


 


Balance 220 














- Medications


Medications: 


 Current Medications





Albuterol/Ipratropium (Duoneb 3 Mg/0.5 Mg (3 Ml) Ud)  3 ml INH RQ6 [facility]


   Last Admin: 01/28/18 14:11 Dose:  3 ml


Aspirin (Aspirin Chewable)  81 mg PO DAILY [facility]


   Last Admin: 01/28/18 09:42 Dose:  81 mg


Cilostazol (Pletal)  50 mg PO BID [facility]


   Last Admin: 01/28/18 09:44 Dose:  50 mg


Gabapentin (Neurontin)  300 mg PO DAILY [facility]


   Last Admin: 01/28/18 09:42 Dose:  300 mg


Heparin Sodium (Porcine) (Heparin)  5,000 units SC Q12 [facility]


   Last Admin: 01/22/18 21:33 Dose:  5,000 units


Home Med (Patient's Own Drops)  2 drop OU BID [facility]


   Last Admin: 01/28/18 09:46 Dose:  2 drop


Azithromycin 500 mg/ Dextrose  250 mls @ 167 mls/hr IVPB Q24H [facility]


   Last Admin: 01/27/18 17:52 Dose:  167 mls/hr


Ceftriaxone Sodium 1 gm/ (Sodium Chloride)  100 mls @ 50 mls/30 min IVPB Q12H 

[facility]


   Last Admin: 01/28/18 14:43 Dose:  50 mls/30 min


Insulin Human Regular (Novolin R)  0 unit SC ACHS [facility]


   PRN Reason: Protocol


   Last Admin: 01/28/18 11:30 Dose:  Not Given


Losartan Potassium (Cozaar)  100 mg PO DAILY [facility]


   Last Admin: 01/28/18 09:42 Dose:  100 mg


Metoprolol Tartrate (Lopressor)  50 mg PO BID [facility]


   Last Admin: 01/28/18 10:01 Dose:  50 mg











- Labs


Labs: 


 





 01/28/18 07:15 





 01/28/18 07:15 





 











PT  13.0 SECONDS (9.7-12.2)  H  01/22/18  13:24    


 


INR  1.2   01/22/18  13:24    


 


APTT  27 SECONDS (21-34)   01/22/18  13:24

## 2018-01-29 VITALS — TEMPERATURE: 97.7 F

## 2018-01-29 VITALS — OXYGEN SATURATION: 97 % | DIASTOLIC BLOOD PRESSURE: 60 MMHG | SYSTOLIC BLOOD PRESSURE: 115 MMHG | HEART RATE: 88 BPM

## 2018-01-29 VITALS — RESPIRATION RATE: 20 BRPM

## 2018-01-29 LAB
ALBUMIN SERPL-MCNC: 3.8 G/DL (ref 3.5–5)
ALBUMIN/GLOB SERPL: 1 {RATIO} (ref 1–2.1)
ALT SERPL-CCNC: 35 U/L (ref 9–52)
AST SERPL-CCNC: 24 U/L (ref 14–36)
BASOPHILS # BLD AUTO: 0.1 K/UL (ref 0–0.2)
BASOPHILS NFR BLD: 0.6 % (ref 0–2)
BUN SERPL-MCNC: 9 MG/DL (ref 7–17)
CALCIUM SERPL-MCNC: 9.4 MG/DL (ref 8.6–10.4)
EOSINOPHIL # BLD AUTO: 0.3 K/UL (ref 0–0.7)
EOSINOPHIL NFR BLD: 3.3 % (ref 0–4)
ERYTHROCYTE [DISTWIDTH] IN BLOOD BY AUTOMATED COUNT: 14.5 % (ref 11.5–14.5)
GFR NON-AFRICAN AMERICAN: > 60
HGB BLD-MCNC: 11.8 G/DL (ref 11–16)
LYMPHOCYTES # BLD AUTO: 2.1 K/UL (ref 1–4.3)
LYMPHOCYTES NFR BLD AUTO: 23.2 % (ref 20–40)
MCH RBC QN AUTO: 30.5 PG (ref 27–31)
MCHC RBC AUTO-ENTMCNC: 34.3 G/DL (ref 33–37)
MCV RBC AUTO: 89 FL (ref 81–99)
MONOCYTES # BLD: 0.8 K/UL (ref 0–0.8)
MONOCYTES NFR BLD: 8.5 % (ref 0–10)
NEUTROPHILS # BLD: 5.8 K/UL (ref 1.8–7)
NEUTROPHILS NFR BLD AUTO: 64.4 % (ref 50–75)
NRBC BLD AUTO-RTO: 0.1 % (ref 0–2)
PLATELET # BLD: 219 K/UL (ref 130–400)
PMV BLD AUTO: 9.3 FL (ref 7.2–11.7)
RBC # BLD AUTO: 3.85 MIL/UL (ref 3.8–5.2)
WBC # BLD AUTO: 9 K/UL (ref 4.8–10.8)

## 2018-01-29 RX ADMIN — IPRATROPIUM BROMIDE AND ALBUTEROL SULFATE SCH: .5; 3 SOLUTION RESPIRATORY (INHALATION) at 07:24

## 2018-01-29 RX ADMIN — CILOSTAZOL SCH MG: 50 TABLET ORAL at 11:03

## 2018-01-29 RX ADMIN — HUMAN INSULIN SCH UNIT: 100 INJECTION, SOLUTION SUBCUTANEOUS at 12:31

## 2018-01-29 RX ADMIN — IPRATROPIUM BROMIDE AND ALBUTEROL SULFATE SCH: .5; 3 SOLUTION RESPIRATORY (INHALATION) at 01:48

## 2018-01-29 RX ADMIN — HUMAN INSULIN SCH: 100 INJECTION, SOLUTION SUBCUTANEOUS at 08:04

## 2018-01-29 RX ADMIN — IPRATROPIUM BROMIDE AND ALBUTEROL SULFATE SCH ML: .5; 3 SOLUTION RESPIRATORY (INHALATION) at 13:24

## 2018-01-29 NOTE — CP.PCM.PN
Subjective





- Date & Time of Evaluation


Date of Evaluation: 01/29/18


Time of Evaluation: 13:18





- Subjective


Subjective: 





CHIEF COMPLAINTS TODAY :


GEN WEAKNESS , WALKS WITH ASSISTANCE


DEPRESSED  





ROS.


HEENT :  N.


Resp :       No  wheezing ,pleuritic CP ,or hemoptysis 


Cardio :     No anginal  CP, PND, orthopnea, palpitation 


GI :           No abd.pain, n/v ,diarrhea or GI bleeding .


CNS : No headache, vertigo, focal deficit.


Musculoskel :  No joint swelling ,


Derm :        No rash 


Psych :     Normal affect.


Ext :  No  swelling ,calf pain 





PE.


Pt. is alert awake in no distress.


V.S  As noted in the chart 


Head ,ear nose,throat and eyes : Normal.


Neck : Supple with normal carotids.


Lungs: Clear air entry.


Heart : S1 & S2 normal with S4. No murmur.


Abd : Soft non tender with normal bowel sounds.


Neuro : Moves all ext. with no localized deficit.


Ext : No edema with intact pulses.Non tender calves 


Derm : No rashes or decubitus ulcer.





LABS/RADIOLOGY: ct chestBIL APICAL PNEUMONIA 





PLAN;


IV AB 


AWAITING TB W/U 








Objective





- Vital Signs/Intake and Output


Vital Signs (last 24 hours): 


 











Temp Pulse Resp BP Pulse Ox


 


 97.9 F   85   20   134/62   96 


 


 01/29/18 07:58  01/29/18 07:58  01/29/18 07:58  01/29/18 07:58  01/29/18 07:58








Intake and Output: 


 











 01/29/18 01/29/18





 11:59 23:59


 


Intake Total 340 


 


Balance 340 














- Medications


Medications: 


 Current Medications





Albuterol/Ipratropium (Duoneb 3 Mg/0.5 Mg (3 Ml) Ud)  3 ml INH RQ6 [facility]


   Last Admin: 01/29/18 07:24 Dose:  Not Given


Aspirin (Aspirin Chewable)  81 mg PO DAILY [facility]


   Last Admin: 01/29/18 11:02 Dose:  81 mg


Cilostazol (Pletal)  50 mg PO BID [facility]


   Last Admin: 01/29/18 11:03 Dose:  50 mg


Gabapentin (Neurontin)  300 mg PO DAILY [facility]


   Last Admin: 01/29/18 11:03 Dose:  300 mg


Heparin Sodium (Porcine) (Heparin)  5,000 units SC Q12 [facility]


   Last Admin: 01/22/18 21:33 Dose:  5,000 units


Home Med (Patient's Own Drops)  2 drop OU BID [facility]


   Last Admin: 01/29/18 12:09 Dose:  2 drop


Azithromycin 500 mg/ Dextrose  250 mls @ 167 mls/hr IVPB Q24H [facility]


   Last Admin: 01/28/18 17:18 Dose:  167 mls/hr


Ceftriaxone Sodium 1 gm/ (Sodium Chloride)  100 mls @ 50 mls/30 min IVPB Q12H 

[facility]


   Last Admin: 01/29/18 01:39 Dose:  50 mls/30 min


Insulin Human Regular (Novolin R)  0 unit SC ACHS [facility]


   PRN Reason: Protocol


   Last Admin: 01/29/18 12:31 Dose:  2 unit


Losartan Potassium (Cozaar)  100 mg PO DAILY [facility]


   Last Admin: 01/29/18 11:02 Dose:  100 mg


Metoprolol Tartrate (Lopressor)  50 mg PO BID [facility]


   Last Admin: 01/29/18 11:02 Dose:  50 mg











- Labs


Labs: 


 





 01/29/18 08:20 





 01/29/18 08:20 





 











PT  13.0 SECONDS (9.7-12.2)  H  01/22/18  13:24    


 


INR  1.2   01/22/18  13:24    


 


APTT  27 SECONDS (21-34)   01/22/18  13:24

## 2018-01-29 NOTE — CP.PCM.PN
Subjective





- Date & Time of Evaluation


Date of Evaluation: 01/29/18


Time of Evaluation: 16:54





- Subjective


Subjective: 





Alert, awake, no sob or distress. 





Objective





- Vital Signs/Intake and Output


Vital Signs (last 24 hours): 


 











Temp Pulse Resp BP Pulse Ox


 


 97.7 F   88   20   115/60   97 


 


 01/29/18 15:30  01/29/18 15:30  01/29/18 15:30  01/29/18 15:30  01/29/18 15:30








Intake and Output: 


 











 01/29/18 01/29/18





 06:59 18:59


 


Intake Total 340 


 


Balance 340 














- Medications


Medications: 


 Current Medications





Albuterol/Ipratropium (Duoneb 3 Mg/0.5 Mg (3 Ml) Ud)  3 ml INH RQ6 [facility]


   Last Admin: 01/29/18 13:24 Dose:  3 ml


Aspirin (Aspirin Chewable)  81 mg PO DAILY [facility]


   Last Admin: 01/29/18 11:02 Dose:  81 mg


Cilostazol (Pletal)  50 mg PO BID [facility]


   Last Admin: 01/29/18 11:03 Dose:  50 mg


Gabapentin (Neurontin)  300 mg PO DAILY [facility]


   Last Admin: 01/29/18 11:03 Dose:  300 mg


Heparin Sodium (Porcine) (Heparin)  5,000 units SC Q12 [facility]


   Last Admin: 01/22/18 21:33 Dose:  5,000 units


Home Med (Patient's Own Drops)  2 drop OU BID [facility]


   Last Admin: 01/29/18 12:09 Dose:  2 drop


Azithromycin 500 mg/ Dextrose  250 mls @ 167 mls/hr IVPB Q24H [facility]


   Last Admin: 01/28/18 17:18 Dose:  167 mls/hr


Ceftriaxone Sodium 1 gm/ (Sodium Chloride)  100 mls @ 50 mls/30 min IVPB Q12H 

[facility]


   Last Admin: 01/29/18 15:11 Dose:  50 mls/30 min


Insulin Human Regular (Novolin R)  0 unit SC ACHS [facility]


   PRN Reason: Protocol


   Last Admin: 01/29/18 12:31 Dose:  2 unit


Losartan Potassium (Cozaar)  100 mg PO DAILY [facility]


   Last Admin: 01/29/18 11:02 Dose:  100 mg


Metoprolol Tartrate (Lopressor)  50 mg PO BID [facility]


   Last Admin: 01/29/18 11:02 Dose:  50 mg











- Labs


Labs: 


 





 01/29/18 08:20 





 01/29/18 08:20 





 











PT  13.0 SECONDS (9.7-12.2)  H  01/22/18  13:24    


 


INR  1.2   01/22/18  13:24    


 


APTT  27 SECONDS (21-34)   01/22/18  13:24    














Assessment and Plan





- Assessment and Plan (Free Text)


Assessment: 





Patient seen and examined. Alert, oriented, afebrile, no sob or chest pains. 

AFB sputum negativex2. Discussed with DR Gaitan and DR Ribera, plan to discharge 

home on po levaquin for 7 days. Advised to to follow up with PMD and DR Gaitan in 

1 week.

## 2018-01-29 NOTE — CARD
--------------- APPROVED REPORT --------------





EXAM: Two-dimensional and M-mode echocardiogram with Doppler and 

color Doppler.



Other Information 

Quality : GoodRhythm : 



INDICATION

Chest Pain Congestive Heart Failure DIZZINESS, CARDIAC FUNCTION



2D DIMENSIONS 

LVEF (%)55.0   (>50%)



Mitral Valve

E/A ratio0.0



TDI

E/Lateral E'0.0E/Medial E'0.0



 LEFT VENTRICLE 

The left ventricle cavity is small.

There is severe concentric left ventricular hypertrophy.

The left ventricular function is normal.

The left ventricular ejection fraction is within the normal range.

There is normal LV segmental wall motion.

Transmitral Doppler flow pattern is Grade I-abnormal relaxation 

pattern.



 RIGHT VENTRICLE 

The right ventricle is normal size.

There is normal right ventricular wall thickness.

The right ventricular systolic function is normal.



 ATRIA 

The left atrium size is normal.

The right atrium size is normal.



 AORTIC VALVE 

The aortic valve is moderately sclerotic.



 MITRAL VALVE 

The mitral valve leaflets are thickened and calcified.

There is no mitral valve regurgitation noted.



 GREAT VESSELS 

The IVC is dilated.



 PERICARDIAL EFFUSION 

There is a small loculated anterior pericardial effusion.



<Conclusion>

The left ventricle cavity is small.

There is severe concentric left ventricular hypertrophy.

The left ventricular function is normal.

The left ventricular ejection fraction is within the normal range.

There is normal LV segmental wall motion.

Transmitral Doppler flow pattern is Grade I-abnormal relaxation 

pattern.

## 2018-01-30 LAB — TB ANTIGEN MINUS NIL: 0.07 IU/ML

## 2018-01-30 NOTE — CP.PCM.DIS
Provider





- Provider


Date of Admission: 


01/22/18 14:15





Attending physician: 


Alex Ribera MD





Time Spent in preparation of Discharge (in minutes): 35





Hospital Course





- Lab Results


Lab Results: 


 Micro Results





01/24/18 14:30   Blood-Venous   Blood Culture - Final


                            NO GROWTH AFTER 5 DAYS


01/24/18 14:30   Blood-Venous   Gram Stain - Final


                            TEST NOT PERFORMED


01/24/18 17:12   Blood-Venous   Blood Culture - Final


                            NO GROWTH AFTER 5 DAYS


01/24/18 17:12   Blood-Venous   Gram Stain - Final


                            TEST NOT PERFORMED


01/26/18 11:57   Other: Please Indicate   Mycobacterial Culture - Preliminary


01/26/18 16:10   Other: Please Indicate   Mycobacterial Culture - Preliminary





 Most Recent Lab Values











WBC  9.0 K/uL (4.8-10.8)   01/29/18  08:20    


 


RBC  3.85 Mil/uL (3.80-5.20)   01/29/18  08:20    


 


Hgb  11.8 g/dL (11.0-16.0)   01/29/18  08:20    


 


Hct  34.3 % (34.0-47.0)   01/29/18  08:20    


 


MCV  89.0 fL (81.0-99.0)   01/29/18  08:20    


 


MCH  30.5 pg (27.0-31.0)   01/29/18  08:20    


 


MCHC  34.3 g/dL (33.0-37.0)   01/29/18  08:20    


 


RDW  14.5 % (11.5-14.5)   01/29/18  08:20    


 


Plt Count  219 K/uL (130-400)   01/29/18  08:20    


 


MPV  9.3 fL (7.2-11.7)   01/29/18  08:20    


 


Neut % (Auto)  64.4 % (50.0-75.0)   01/29/18  08:20    


 


Lymph % (Auto)  23.2 % (20.0-40.0)   01/29/18  08:20    


 


Mono % (Auto)  8.5 % (0.0-10.0)   01/29/18  08:20    


 


Eos % (Auto)  3.3 % (0.0-4.0)   01/29/18  08:20    


 


Baso % (Auto)  0.6 % (0.0-2.0)   01/29/18  08:20    


 


Neut #  5.8 K/uL (1.8-7.0)   01/29/18  08:20    


 


Lymph #  2.1 K/uL (1.0-4.3)   01/29/18  08:20    


 


Mono #  0.8 K/uL (0.0-0.8)   01/29/18  08:20    


 


Eos #  0.3 K/uL (0.0-0.7)   01/29/18  08:20    


 


Baso #  0.1 K/uL (0.0-0.2)   01/29/18  08:20    


 


ESR  105 mm/hr (0-20)  H  01/25/18  07:34    


 


PT  13.0 SECONDS (9.7-12.2)  H  01/22/18  13:24    


 


INR  1.2   01/22/18  13:24    


 


APTT  27 SECONDS (21-34)   01/22/18  13:24    


 


Sodium  135 mmol/L (132-148)   01/29/18  08:20    


 


Potassium  4.7 mmol/L (3.6-5.2)   01/29/18  08:20    


 


Chloride  103 mmol/L ()   01/29/18  08:20    


 


Carbon Dioxide  21 mmol/L (22-30)  L  01/29/18  08:20    


 


Anion Gap  16  (10-20)   01/29/18  08:20    


 


BUN  9 mg/dL (7-17)   01/29/18  08:20    


 


Creatinine  0.9 mg/dL (0.7-1.2)   01/29/18  08:20    


 


Est GFR ( Amer)  > 60   01/29/18  08:20    


 


Est GFR (Non-Af Amer)  > 60   01/29/18  08:20    


 


POC Glucose (mg/dL)  228 mg/dL ()  H  01/29/18  16:19    


 


Random Glucose  121 mg/dL ()  H  01/29/18  08:20    


 


Hemoglobin A1c  7.5 % (4.2-6.5)  H  01/22/18  13:24    


 


Calcium  9.4 mg/dl (8.6-10.4)   01/29/18  08:20    


 


Phosphorus  3.0 mg/dL (2.5-4.5)   01/28/18  07:15    


 


Magnesium  1.2 mg/dL (1.6-2.3)  L  01/28/18  07:15    


 


Total Bilirubin  0.5 mg/dL (0.2-1.3)   01/29/18  08:20    


 


Direct Bilirubin  0.5 mg/dL (0.0-0.4)  H  01/25/18  07:34    


 


AST  24 U/L (14-36)   01/29/18  08:20    


 


ALT  35 U/L (9-52)   01/29/18  08:20    


 


Alkaline Phosphatase  54 U/L ()   01/29/18  08:20    


 


Troponin I  0.1080 ng/mL (0.00-0.120)   01/22/18  13:24    


 


C-React Prot High Sens  > 15.00 mg/L (1.00-3.00)  H  01/25/18  07:34    


 


NT-Pro-B Natriuret Pep  2670 pg/mL (0-900)  H  01/25/18  07:34    


 


Total Protein  7.6 g/dL (6.3-8.3)   01/29/18  08:20    


 


Albumin  3.8 g/dL (3.5-5.0)   01/29/18  08:20    


 


Globulin  3.8 gm/dL (2.2-3.9)   01/29/18  08:20    


 


Albumin/Globulin Ratio  1.0  (1.0-2.1)   01/29/18  08:20    


 


Triglycerides  93 mg/dL (0-149)  D 01/22/18  13:24    


 


Cholesterol  117 mg/dL (0-199)   01/22/18  13:24    


 


LDL Cholesterol Direct  61 mg/dL (0-129)   01/22/18  13:24    


 


HDL Cholesterol  32 mg/dL (30-70)   01/22/18  13:24    


 


Procalcitonin  < 0.05 NG/ML (0.19-0.49)  L  01/25/18  07:34    


 


Urine Color  Carolyn  (YELLOW)   01/22/18  14:49    


 


Urine Clarity  Hazy  (Clear)   01/22/18  14:49    


 


Urine pH  5.0  (5.0-8.0)   01/22/18  14:49    


 


Ur Specific Gravity  1.026  (1.003-1.030)   01/22/18  14:49    


 


Urine Protein  1+ mg/dL (NEGATIVE)  H  01/22/18  14:49    


 


Urine Glucose (UA)  Normal mg/dL (Normal)   01/22/18  14:49    


 


Urine Ketones  Negative mg/dL (NEGATIVE)   01/22/18  14:49    


 


Urine Blood  2+  (NEGATIVE)  H  01/22/18  14:49    


 


Urine Nitrate  Negative  (NEGATIVE)   01/22/18  14:49    


 


Urine Bilirubin  1+  (NEGATIVE)  H  01/22/18  14:49    


 


Urine Urobilinogen  Normal mg/dL (0.2-1.0)   01/22/18  14:49    


 


Ur Leukocyte Esterase  1+ Zohaib/uL (Negative)  H  01/22/18  14:49    


 


Urine WBC (Auto)  6 /hpf (0-5)  H  01/22/18  14:49    


 


Urine RBC (Auto)  12 /hpf (0-3)  H  01/22/18  14:49    


 


Ur Squamous Epith Cells  2 /hpf (0-5)   01/22/18  14:49    


 


Urine Bacteria  Rare  (<OCC)   01/22/18  14:49    


 


Hyaline Casts  6-10 /lpf (0-2)  H  01/22/18  14:49    


 


Ur L.pneumophila Ag  Negative  (NEGATIVE)   01/24/18  07:26    


 


Mycoplasma pneumon IgM  Negative  (NEGATIVE)   01/25/18  07:34    


 


TB Test (QFT) Nil  0.04 IU/mL  01/26/18  08:03    


 


TB Test Mitogen - Nil  3.64 IU/mL  01/26/18  08:03    


 


TB Test TB - Nil  0.07 IU/mL  01/26/18  08:03    


 


TB Test (QFT)  Negative  (Negative)   01/26/18  08:03    














- Hospital Course


Hospital Course: 





BROUGHT TO ER AFTER A FALL WHILE GETTING OUT OF [employer]  ON THE DAY OF 

ADMISSION . NO LOC/SEIZURES 


SUSTAINED MILD HEAD TRAUMA .


ER , CT HEAD NEG FOR ACUTE ABNORMALITY , CR HAS INCREASED TO 3.0 FROM BASELINE 

1.2 





PAST HIST.


T2DM .HTN.


OFFICIAL REPOST OF CXR SHOWED INFILTRATE AND ID WAS CONSULTED 


IB AB WAS STARTED 


CT CHEST SHOWED BILATERAL APICAL INFILTRATE 


TB W/U INITIALLY SHOWED 2 NEG AFB SPUTUM , GOLD TEST PENDING 


PT AYMPTOMATIC , AFEBRILE AND V ANXIOUS TO GO HOME 


PT D/C , F/U ID AND PULM ON LEVAQUIN 





Discharge Exam





- Head Exam


Head Exam: NORMAL INSPECTION





Discharge Plan





- Discharge Medications


Prescriptions: 


levoFLOXacin [Levaquin] 500 mg PO DAILY #7 tab





- Follow Up Plan


Condition: GOOD


Disposition: HOME/ ROUTINE


Instructions:  Levofloxacin (By mouth), Acute Kidney Injury (DC)


Additional Instructions: 





follow up with PMD in 1 week


f/u with DR GAITAN IN 1week





levaquin 500mg po daily x7 days


Referrals: 


Pa Gaitan MD [Staff Provider] - 


Alex Ribera MD [Staff Provider] -

## 2018-02-05 ENCOUNTER — HOSPITAL ENCOUNTER (INPATIENT)
Dept: HOSPITAL 31 - C.ER | Age: 83
LOS: 4 days | Discharge: NURSING, REHAB OR LONG-TERM CARE | DRG: 194 | End: 2018-02-09
Attending: INTERNAL MEDICINE | Admitting: INTERNAL MEDICINE
Payer: MEDICARE

## 2018-02-05 DIAGNOSIS — J18.9: Primary | ICD-10-CM

## 2018-02-05 DIAGNOSIS — I50.9: ICD-10-CM

## 2018-02-05 DIAGNOSIS — E11.9: ICD-10-CM

## 2018-02-05 DIAGNOSIS — R55: ICD-10-CM

## 2018-02-05 DIAGNOSIS — Z87.01: ICD-10-CM

## 2018-02-05 DIAGNOSIS — J84.9: ICD-10-CM

## 2018-02-05 DIAGNOSIS — E03.9: ICD-10-CM

## 2018-02-05 DIAGNOSIS — I11.0: ICD-10-CM

## 2018-02-05 DIAGNOSIS — I25.10: ICD-10-CM

## 2018-02-05 DIAGNOSIS — J44.9: ICD-10-CM

## 2018-02-05 DIAGNOSIS — N28.9: ICD-10-CM

## 2018-02-05 LAB
ALBUMIN SERPL-MCNC: 3.7 G/DL (ref 3.5–5)
ALBUMIN/GLOB SERPL: 1.1 {RATIO} (ref 1–2.1)
ALT SERPL-CCNC: 37 U/L (ref 9–52)
AST SERPL-CCNC: 34 U/L (ref 14–36)
BASOPHILS # BLD AUTO: 0.1 K/UL (ref 0–0.2)
BASOPHILS NFR BLD: 0.6 % (ref 0–2)
BUN SERPL-MCNC: 26 MG/DL (ref 7–17)
CALCIUM SERPL-MCNC: 8.7 MG/DL (ref 8.6–10.4)
EOSINOPHIL # BLD AUTO: 0.2 K/UL (ref 0–0.7)
EOSINOPHIL NFR BLD: 1.5 % (ref 0–4)
ERYTHROCYTE [DISTWIDTH] IN BLOOD BY AUTOMATED COUNT: 14.8 % (ref 11.5–14.5)
GFR NON-AFRICAN AMERICAN: 39
HGB BLD-MCNC: 11.2 G/DL (ref 11–16)
LYMPHOCYTES # BLD AUTO: 2.2 K/UL (ref 1–4.3)
LYMPHOCYTES NFR BLD AUTO: 21 % (ref 20–40)
MCH RBC QN AUTO: 31.1 PG (ref 27–31)
MCHC RBC AUTO-ENTMCNC: 35.1 G/DL (ref 33–37)
MCV RBC AUTO: 88.6 FL (ref 81–99)
MONOCYTES # BLD: 1 K/UL (ref 0–0.8)
MONOCYTES NFR BLD: 9.5 % (ref 0–10)
NEUTROPHILS # BLD: 7 K/UL (ref 1.8–7)
NEUTROPHILS NFR BLD AUTO: 67.4 % (ref 50–75)
NRBC BLD AUTO-RTO: 0 % (ref 0–2)
PLATELET # BLD: 173 K/UL (ref 130–400)
PMV BLD AUTO: 9.3 FL (ref 7.2–11.7)
RBC # BLD AUTO: 3.59 MIL/UL (ref 3.8–5.2)
WBC # BLD AUTO: 10.4 K/UL (ref 4.8–10.8)

## 2018-02-06 LAB
APTT BLD: 29 SECONDS (ref 21–34)
ARTERIAL BLOOD GAS HEMOGLOBIN: 11.3 G/DL (ref 11.7–17.4)
ARTERIAL BLOOD GAS O2 SAT: 97.9 % (ref 95–98)
ARTERIAL BLOOD GAS PCO2: 27 MM/HG (ref 35–45)
ARTERIAL BLOOD GAS TCO2: 17.9 MMOL/L (ref 22–28)
ARTERIAL PATENCY WRIST A: (no result)
CK MB SERPL-MCNC: 1.66 NG/ML (ref 0–3.38)
CK MB SERPL-MCNC: 1.76 NG/ML (ref 0–3.38)
HCO3 BLDA-SCNC: 20 MMOL/L (ref 21–28)
INHALED O2 CONCENTRATION: 21 %
INR PPP: 1.2
PH BLDA: 7.41 [PH] (ref 7.35–7.45)
PO2 BLDA: 74 MM/HG (ref 80–100)
PROTHROMBIN TIME: 13.5 SECONDS (ref 9.7–12.2)
TROPONIN I SERPL-MCNC: 0.04 NG/ML (ref 0–0.12)
TROPONIN I SERPL-MCNC: 0.05 NG/ML (ref 0–0.12)

## 2018-02-06 RX ADMIN — HUMAN INSULIN SCH UNIT: 100 INJECTION, SOLUTION SUBCUTANEOUS at 13:26

## 2018-02-06 RX ADMIN — HUMAN INSULIN SCH: 100 INJECTION, SOLUTION SUBCUTANEOUS at 17:16

## 2018-02-06 RX ADMIN — CILOSTAZOL SCH MG: 50 TABLET ORAL at 11:30

## 2018-02-06 RX ADMIN — HUMAN INSULIN SCH: 100 INJECTION, SOLUTION SUBCUTANEOUS at 21:46

## 2018-02-06 RX ADMIN — CILOSTAZOL SCH MG: 50 TABLET ORAL at 17:35

## 2018-02-06 RX ADMIN — HUMAN INSULIN SCH: 100 INJECTION, SOLUTION SUBCUTANEOUS at 08:53

## 2018-02-06 RX ADMIN — PANTOPRAZOLE SODIUM SCH MG: 40 TABLET, DELAYED RELEASE ORAL at 11:30

## 2018-02-06 RX ADMIN — Medication SCH TAB: at 14:41

## 2018-02-06 NOTE — CP.PCM.CON
History of Present Illness





- History of Present Illness


History of Present Illness: 


INFECTIOUS DISEASE CONSULT;


HPI;


82-year-old female was admitted as she is brought in by family members for 

dizziness and frequent falling.


Patient has multiple medical problems including recent pneumonia, exacerbation 

of bronchial asthma, bronchitis, arthritis, CHF, CAD with diabetes, hypertension

, hypercholesterolemia, hypothyroidism and osteoporosis.


Patient was recently worked up for TB but her QuantiFERON Gold TB test came 

back negative.  Patient's sputum times from 1/26/18 and 1/29/18 3 specimens 

have been negative for AFB smear and culture is pending.  Patient denies any 

previous history of TB or contact with TB.





Patient presently also complaining of poor appetite and abdominal pain.  

Patient denies any diarrhea or obstipation.  States that she gets abdominal 

pain as soon as she eats and her intake is poor.  He denies any melena or dark 

colored stools.


Patient also denies any night sweats or loss of weight.


INFECTIOUS DISEASE CONSULTATION REQUESTED BY PMD FOR FOLLOW-UP ON HER TB W/U. 

WILL








PMH; AS ABOVE.


SOCIAL HISTORY DENIES SMOKING OR DRINKING OR ANY SUBSTANCE ABUSE.  lIVES ALONE 

AS PER FAMILY MEMBERS.


fAMILY HISTORY; UNREMARKABLE


IMMUNIZATIONS; UP-TO-DATE ON INFLUENZA VACCINATION. DOES NOT KNOW PNEUMOCOCCAL 

OR TETANUS TOXOID VACCINATION


MEDS ; REVIEWED














Review of Systems





- Constitutional


Constitutional: Frequent Falls, Weakness.  absent: Chills, Fever, Night Sweats, 

Weight Loss





- EENT


Eyes: absent: Change in Vision, Floaters


Nose/Mouth/Throat: absent: Dry Mouth





- Cardiovascular


Cardiovascular: Dyspnea on Exertion.  absent: Chest Pain, Dyspnea





- Respiratory


Respiratory: absent: Cough, Hemoptysis





- Gastrointestinal


Gastrointestinal: Abdominal Pain, Bloating.  absent: Constipation, Diarrhea, 

Nausea, Vomiting





- Genitourinary


Genitourinary: absent: Freq UTI





- Musculoskeletal


Musculoskeletal: Abnormal Gait





- Neurological


Neurological: Abnormal Gait, Dizziness, Frequent Falls





- Psychiatric


Psychiatric: Anxiety, Change in Appetite, Depression.  absent: Memory Loss





- Hematologic/Lymphatic


Hematologic: As Per HPI.  absent: Easy Bruising, Lymphadenopathy





Past Patient History





- Past Medical History & Family History


Past Medical History?: Yes





- Past Social History


Smoking Status: Never Smoked





- CARDIAC


Hx Cardiac Disorders: Yes


Hx Congestive Heart Failure: Yes


Hx Hypercholesterolemia: Yes


Hx Hypertension: Yes


Hx Peripheral Edema: Yes





- PULMONARY


Hx Respiratory Disorders: Yes


Hx Asthma: Yes


Hx Bronchitis: Yes


Hx Chronic Obstructive Pulmonary Disease (COPD): Yes





- NEUROLOGICAL


Hx Neurological Disorder: Yes


Hx Dizziness: Yes





- HEENT


Hx HEENT Problems: Yes


Hx Cataracts: Yes (cataract surgery 3 years ago?)


Hx Glaucoma: No


Other/Comment: wear eyeglasses





- RENAL


Hx Chronic Kidney Disease: No





- ENDOCRINE/METABOLIC


Hx Hypothyroidism: Yes





- HEMATOLOGICAL/ONCOLOGICAL


Hx Blood Disorders: No





- INTEGUMENTARY


Hx Dermatological Problems: No





- MUSCULOSKELETAL/RHEUMATOLOGICAL


Hx Arthritis: Yes


Hx Falls: Yes


Hx Osteoporosis: Yes





- GASTROINTESTINAL


Hx Gastrointestinal Disorders: No





- GENITOURINARY/GYNECOLOGICAL


Hx Genitourinary Disorders: No





- PSYCHIATRIC


Hx Substance Use: No





- SURGICAL HISTORY


Hx Surgeries: Yes


Hx Orthopedic Surgery: Yes (left arm surgery 8 yrs ago)





- ANESTHESIA


Hx Anesthesia: Yes


Hx Anesthesia Reactions: No


Hx Malignant Hyperthermia: No


Has any member of the family had a problem w/ anesthesia?: No





Meds


Allergies/Adverse Reactions: 


 Allergies











Allergy/AdvReac Type Severity Reaction Status Date / Time


 


No Known Allergies Allergy   Verified 02/05/18 21:47














- Medications


Medications: 


 Current Medications





Aspirin (Aspirin Chewable)  81 mg PO DAILY [facility]


   Last Admin: 02/06/18 11:30 Dose:  81 mg


Cilostazol (Pletal)  50 mg PO BID [facility]


   Last Admin: 02/06/18 17:35 Dose:  50 mg


Ferrous Sulfate (Feosol)  325 mg PO DAILY [facility]


   Last Admin: 02/06/18 11:30 Dose:  325 mg


Gabapentin (Neurontin)  300 mg PO DAILY [facility]


   Last Admin: 02/06/18 11:30 Dose:  300 mg


Heparin Sodium (Porcine) (Heparin)  5,000 units SC Q12 [facility]


Home Med (Azelastine Hcl [Azelastine Hcl])  0.05 % OU BID [facility]


Hydrochlorothiazide (Hydrodiuril)  25 mg PO DAILY [facility]


   Last Admin: 02/06/18 14:41 Dose:  25 mg


Insulin Human Regular (Novolin R)  0 unit SC ACHS [facility]


   PRN Reason: Protocol


   Last Admin: 02/06/18 17:16 Dose:  Not Given


Losartan Potassium (Cozaar)  100 mg PO DAILY [facility]


   Last Admin: 02/06/18 14:41 Dose:  100 mg


[facility]  [facility]


   Last Admin: 02/06/18 17:35 Dose:  1,000 mg


Metoprolol Tartrate (Lopressor)  50 mg PO BID [facility]


   Last Admin: 02/06/18 17:35 Dose:  50 mg


Multivitamins (Hexavitamin)  1 tab PO DAILY [facility]


   Last Admin: 02/06/18 14:41 Dose:  1 tab


Pantoprazole Sodium (Protonix Ec Tab)  40 mg PO DAILY [facility]


   Last Admin: 02/06/18 11:30 Dose:  40 mg


Rosuvastatin Calcium (Crestor)  5 mg PO [facility]


Sitagliptin Phosphate (Januvia)  100 mg PO DAILY [facility]


   Last Admin: 02/06/18 14:41 Dose:  100 mg











Physical Exam





- Constitutional


Appears: No Acute Distress





- Head Exam


Head Exam: NORMAL INSPECTION





- Eye Exam


Eye Exam: EOMI, PERRL





- ENT Exam


ENT Exam: Normal Oropharynx





- Neck Exam


Neck exam: Positive for: Normal Inspection





- Respiratory Exam


Respiratory Exam: Clear to Auscultation Bilateral





- Cardiovascular Exam


Cardiovascular Exam: REGULAR RHYTHM, +S1, +S2





- GI/Abdominal Exam


GI & Abdominal Exam: Normal Bowel Sounds, Soft, Tenderness (generalized.)





- Extremities Exam


Extremities exam: Positive for: pedal pulses present.  Negative for: calf 

tenderness, pedal edema





- Neurological Exam


Neurological exam: Alert, CN II-XII Intact, Oriented x3, Reflexes Normal





- Psychiatric Exam


Psychiatric exam: Normal Mood





- Skin


Skin Exam: Normal Color, Warm





Results





- Vital Signs


Recent Vital Signs: 


 Last Vital Signs











Temp  97.2 F L  02/06/18 16:11


 


Pulse  76   02/06/18 18:50


 


Resp  20   02/06/18 16:11


 


BP  114/72   02/06/18 16:11


 


Pulse Ox  98   02/06/18 16:11














- Labs


Result Diagrams: 


 02/05/18 23:18





 02/05/18 23:18


Labs: 


 Laboratory Results - last 24 hr











  02/05/18 02/05/18 02/06/18





  23:18 23:18 02:02


 


WBC  10.4  


 


RBC  3.59 L  


 


Hgb  11.2  


 


Hct  31.8 L  


 


MCV  88.6  


 


MCH  31.1 H  


 


MCHC  35.1  


 


RDW  14.8 H  


 


Plt Count  173  


 


MPV  9.3  


 


Neut % (Auto)  67.4  


 


Lymph % (Auto)  21.0  


 


Mono % (Auto)  9.5  


 


Eos % (Auto)  1.5  


 


Baso % (Auto)  0.6  


 


Neut # (Auto)  7.0  


 


Lymph # (Auto)  2.2  


 


Mono # (Auto)  1.0 H  


 


Eos # (Auto)  0.2  


 


Baso # (Auto)  0.1  


 


PT   


 


INR   


 


APTT   


 


Puncture Site    Rr


 


pCO2    27 L


 


pO2    74 L


 


HCO3    20.0 L


 


ABG pH    7.41


 


ABG Total CO2    17.9 L


 


ABG O2 Saturation    97.9


 


ABG Base Excess    -6.3 L


 


ABG Hemoglobin    11.3 L


 


ABG Carboxyhemoglobin    1.9 H


 


POC ABG HHb (Measured)    2.0


 


ABG Methemoglobin    1.3


 


Jj Test    Pos


 


A-a O2 Difference    42.0


 


Respiratory Index    0.6


 


Hgb O2 Saturation    94.8 L


 


FiO2    21.0


 


Sodium   134 


 


Potassium   4.2 


 


Chloride   106 


 


Carbon Dioxide   16 L 


 


Anion Gap   17 


 


BUN   26 H 


 


Creatinine   1.3 H 


 


Est GFR ( Amer)   48 


 


Est GFR (Non-Af Amer)   39 


 


POC Glucose (mg/dL)   


 


Random Glucose   81 


 


Calcium   8.7 


 


Total Bilirubin   0.5 


 


AST   34 


 


ALT   37 


 


Alkaline Phosphatase   50 


 


Total Creatine Kinase   


 


CK-MB (Mass)   


 


Troponin I   


 


Total Protein   7.2 


 


Albumin   3.7 


 


Globulin   3.4 


 


Albumin/Globulin Ratio   1.1 














  02/06/18 02/06/18 02/06/18





  02:36 07:37 11:35


 


WBC   


 


RBC   


 


Hgb   


 


Hct   


 


MCV   


 


MCH   


 


MCHC   


 


RDW   


 


Plt Count   


 


MPV   


 


Neut % (Auto)   


 


Lymph % (Auto)   


 


Mono % (Auto)   


 


Eos % (Auto)   


 


Baso % (Auto)   


 


Neut # (Auto)   


 


Lymph # (Auto)   


 


Mono # (Auto)   


 


Eos # (Auto)   


 


Baso # (Auto)   


 


PT   


 


INR   


 


APTT   


 


Puncture Site   


 


pCO2   


 


pO2   


 


HCO3   


 


ABG pH   


 


ABG Total CO2   


 


ABG O2 Saturation   


 


ABG Base Excess   


 


ABG Hemoglobin   


 


ABG Carboxyhemoglobin   


 


POC ABG HHb (Measured)   


 


ABG Methemoglobin   


 


Jj Test   


 


A-a O2 Difference   


 


Respiratory Index   


 


Hgb O2 Saturation   


 


FiO2   


 


Sodium   


 


Potassium   


 


Chloride   


 


Carbon Dioxide   


 


Anion Gap   


 


BUN   


 


Creatinine   


 


Est GFR ( Amer)   


 


Est GFR (Non-Af Amer)   


 


POC Glucose (mg/dL)   72 


 


Random Glucose   


 


Calcium   


 


Total Bilirubin   


 


AST   


 


ALT   


 


Alkaline Phosphatase   


 


Total Creatine Kinase  130   116


 


CK-MB (Mass)  1.66   1.76


 


Troponin I  0.0470   0.0420


 


Total Protein   


 


Albumin   


 


Globulin   


 


Albumin/Globulin Ratio   














  02/06/18 02/06/18 02/06/18





  11:35 11:51 17:07


 


WBC   


 


RBC   


 


Hgb   


 


Hct   


 


MCV   


 


MCH   


 


MCHC   


 


RDW   


 


Plt Count   


 


MPV   


 


Neut % (Auto)   


 


Lymph % (Auto)   


 


Mono % (Auto)   


 


Eos % (Auto)   


 


Baso % (Auto)   


 


Neut # (Auto)   


 


Lymph # (Auto)   


 


Mono # (Auto)   


 


Eos # (Auto)   


 


Baso # (Auto)   


 


PT  13.5 H  


 


INR  1.2  


 


APTT  29  


 


Puncture Site   


 


pCO2   


 


pO2   


 


HCO3   


 


ABG pH   


 


ABG Total CO2   


 


ABG O2 Saturation   


 


ABG Base Excess   


 


ABG Hemoglobin   


 


ABG Carboxyhemoglobin   


 


POC ABG HHb (Measured)   


 


ABG Methemoglobin   


 


Jj Test   


 


A-a O2 Difference   


 


Respiratory Index   


 


Hgb O2 Saturation   


 


FiO2   


 


Sodium   


 


Potassium   


 


Chloride   


 


Carbon Dioxide   


 


Anion Gap   


 


BUN   


 


Creatinine   


 


Est GFR ( Amer)   


 


Est GFR (Non-Af Amer)   


 


POC Glucose (mg/dL)   152 H  110


 


Random Glucose   


 


Calcium   


 


Total Bilirubin   


 


AST   


 


ALT   


 


Alkaline Phosphatase   


 


Total Creatine Kinase   


 


CK-MB (Mass)   


 


Troponin I   


 


Total Protein   


 


Albumin   


 


Globulin   


 


Albumin/Globulin Ratio   














- Imaging and Cardiology


  ** CT scan - chest


Status: Report reviewed by me (2/6/18-consistent with mosaic pattern of lung 

parenchyma with groundglass opacities-nonspecific, interstitial edema, 

interstitial lung disease.  Incidental thyroid nodule to follow up clinically.  

See full report.)





Assessment & Plan


(1) Syncope


Status: Acute   





(2) Interstitial lung disease


Status: Acute   





(3) Congestive heart failure


Status: Acute   





(4) Dizziness


Status: Acute   





(5) History of fall


Status: Acute   





- Assessment and Plan (Free Text)


Plan: 





PLAN;


 W/U FOR TB IS NEGATIVE, WITH NEGATIVE qUANTIferon gOLD tb TEST.


CT OF THE CHEST 2/6/18 REVIEWED CONSISTENT WITH PROBABLY CHRONIC NONSPECIFIC 

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





PATIENT PRESENTLY COMPLAINING OF ABDOMINAL PAINS,AND BLOATING.


PATIENT ALREADY STARTED ON pROTONIX.


STOOLS FOR C. DIFFICILE AS RECENT HISTORY OF ANTIBIOTIC THERAPY.


CT OF THE ABDOMEN AND PELVIS WITH BY MOUTH CONTRAST R/O MASS VERSUS COLITIS.


PATIENT PRESENTLY UNDERGOING WORKUP FOR SYNCOPE.


GAIT TRAINING AND PT.


NO NEED FOR ISOLATION.


wILL FOLLOW ALONG WITH YOU.

## 2018-02-06 NOTE — CARD
--------------- APPROVED REPORT --------------





EKG Measurement

Heart [id number]

[id number]

[id number]

[id number]

[id number]



<Conclusion>

Sinus tachycardia with premature supraventricular complexes and 

fusion complexes

Left ventricular hypertrophy with repolarization abnormality

Abnormal ECG

## 2018-02-06 NOTE — RAD
PROCEDURE:  



HISTORY:

injury/ pain



COMPARISON:

MR foot 10/23/2015 right foot x-ray 6/9/2017. No ankle specific 

imaging studies noted



TECHNIQUE:

Three views each ankle



FINDINGS:

Left ankle: Exuberant osseous hypertrophic changes dorsal carried 

navicular region noted navicular bone appears dysmorphic prior trauma 

and/or possible prior osteo necrosis not excluded. Decreased left 

plantar arch inferred. Medial tibial talar cystic arthrosis.  Os 

peroneum suspect.  Inferior and posterior calcaneal spurring 2 x 1 mm 

ossification anterior tibiotalar soft tissue possible flake chip 

fracture fragment donor site not noted and chronicity unknown. Talar 

dome intact. No ankle mortise widening. 



Right ankle tarsal metatarsal arthro pathic dorsal osseous 

hypertrophic change present. Inferior and posterior calcaneal 

spurring. Medial tibiotalar cystic arthrosis. Talar dome intact. No 

ankle mortise widening 







IMPRESSION:

Bilateral: Multifocal hypertrophic/cystic arthrosis. No definitive 

acute fracture appreciated. No ankle mortise widening. 



Asymmetrical left midfoot partial collapse suspect with  dorsal monico 

navicular exuberant osseous productive changes. Prior trauma here 

suspect ; superimposed osteo necrosis not excluded ; at minimum, 

sclerotic intertarsal arthrosis present. 



Bilateral calcaneal spurs

## 2018-02-06 NOTE — CT
EXAM:

  CT Head Without Intravenous Contrast



CLINICAL HISTORY:

  82 years old, female; Injury or trauma; Fall; Initial encounter; Concussion / 

head injury; Additional info: Headache



TECHNIQUE:

  Axial computed tomography images of the head/brain without intravenous 

contrast.  All CT scans at this facility use one or more dose reduction 

techniques, viz.: automated exposure control; ma/kV adjustment per patient size 

(including targeted exams where dose is matched to indication; i.e. head); or 

iterative reconstruction technique.



COMPARISON:

  No relevant prior studies available.



FINDINGS:

  Brain:  Moderate atrophy.  No intracranial hemorrhage.  No mass.  Several 

scattered foci of decreased attenuation within periventricular/subcortical 

white matter.  Probable chronic lacunar infarct about RIGHT basal ganglia.  No 

edema.

  Ventricles:  No hydrocephalus.

  Bones/joints:  No acute fracture.

  Soft tissues:  Unremarkable.

  Vasculature:  Atherosclerotic disease of intracranial arteries.

  Sinuses:  Scattered mild mucosal thickening of ethmoid sinuses.  Air-fluid 

levels within maxillary sinuses.  Mild mucous within RIGHT sphenoid sinus.  

LEFT frontal retention cyst.

  Mastoid air cells:  No mastoid effusion.

  Orbits:  Unremarkable as visualized.



IMPRESSION:     

1.  No intracranial hemorrhage.

2.  Nonspecific white matter changes.  

3.  Sinus disease.

4.  Incidental/non-acute findings are described above.

## 2018-02-06 NOTE — CP.PCM.HP
History of Present Illness





- History of Present Illness


History of Present Illness: 





COMPREHENSIVE   HISTORY & PHYSICAL EXAM 


   


HPI


ADMITTED FOR DIZZINESS AND FREQUENT FALLING 


HAD SIMILAR COMPLAINTS LAST MONTH AND WAS HOSPITALIZED IN  . DURING THE LAST 

VISIT , PT HAD GHAZAL APICAL INFILTRATE AND PRELIMINARY W/U FOR TB WAS NEG 


PT WENT HOME ON LEVAQUIN 


FAMILY BROUGHT THE PT BACK TO HOSPITAL WITH WEAKNESS AND DIZZINESS AND FALLS 


PT LAST ADMISSION DECLINED SHORT TERM REHAB 


LIVES ALONE 





PAST HIST.


HTN.T2DM COPD 


PERSONAL HIST:   Smoking.   N   Alcohol.   N      Allergy N            Travel_-

      .


FAMILY HIST : 


ROS :


Constitutional: Negative for weight change, 


  Eyes: Negative for redness, swelling, itching, discharge, vision changes, 

blurry vision, double vision, glaucoma, cataracts, 


  Ears: Negative for hearing loss, ringing, , tinnitus, vertigo


 Nose: Negative for rhinorrhea, stuffiness, sniffing, itching, postnasal drip, 

discoloration, nasal congestion and epistaxis. 


 Throat: Negative for throat clearing, sore throat, hoarseness, difficulty 

swallowing and difficulty speaking. 


  Respiratory: Negative for cough, , sputum production, chest tightness,  

wheezing, pleuritic chest pain ,daytime somnolence, chronic cough, hemoptysis, 

snoring at night,


 Cardiovascular: Negative for chest pain, palpitations, orthopnea, PND, Edema 

of legs, leg cramps, angina, claudication, , irregular heartbeat,


 Neurology: Negative for irritability, muscle weakness, numbness and tingling, 

seizures, tremors, migraines, slurred speech, , recurrent headaches 


Gastrointestinal: Negative for difficulty swallowing, diarrhea, constipation, 

black stools, rectal bleeding, nausea, flatulence, reflux, poor appetite, 

changes in bowel habits, abdominal pain


  Genitourinary: Negative for frequent urination, hematuria, discharge, 

incontinence, urinary retention, frequent UTI, Psychiatric: Negative for 

depression, anxiety/panic, suicidal tendencies, 


Musculoskeletal: Negative for swollen joints, back pain, , neck pain, morning 

stiffness of joints, . 


 Skin: Negative for rash, ulcers, itching, dry skin and pigmented lesions.


P/E: 


  Constitutional: Appears stated age and in no apparent distress. 


 Head: Normocephalic. 


  Ears: External ear canals patent without inflammation. Tympanic membranes 

intact with normal light reflex and landmark. 


  Eyes: Pupils are central, bilaterally equal, symmetrical and reacts to light 

with normal movements and no icterus or pallor. 


 Nose: External nares are patent. Mucosa is pink  Mouth-Throat: Good general 

appearance and condition. No post-pharyngeal/oropharyngeal erythema and 

tonsillar hypertrophy. Good dental hygiene. 


  Neck-Lymphatic: Neck is supple with normal ROM, no thyromegaly, lymph nodes 

or masses. JVD is normal with no carotid bruit. 


  Lungs: Clear to percussion and auscultation with bilateral  RONCHI


  Cardiovascular: S1 and S2 are normal with no murmurs, gallops and rub. 


  GI Exam: No hepatomegaly. Abdomen is soft and non-tender. No Organomegaly , 

masses or hernias are evident and bowel sounds are normal and active. 


  Neurology: Higher function and all cranial nerves intact, with no gross motor 

or sensory deficit. Superficial and deep reflexes are normal with downwards 

planters. No cerebellar deficit with normal gait. 


  Musculoskeletal: No tender spots with normal curvature of the spine with no 

swelling or restricted ROM of the small and large joints. 


  Extremities: Homans sign absent. Intact pulses with no pitting edema, calf 

tenderness or skin color changes. 


  Skin: No rash, eruptions or abnormal skin pigmentation





LAB/RADIOLOGY:


ASSESMENT :


RESOLVING GHAZAL APICAL PNEUMONIA 


WEAKNESS AND DIZZY SPELL 


T2DM 





PLAN: 


SEE ORDERS 








Present on Admission





- Present on Admission


Any Indicators Present on Admission: No





Past Patient History





- Past Medical History & Family History


Past Medical History?: Yes





- Past Social History


Smoking Status: Never Smoked





- CARDIAC


Hx Congestive Heart Failure: Yes


Hx Hypercholesterolemia: Yes


Hx Hypertension: Yes


Hx Peripheral Edema: Yes





- PULMONARY


Hx Asthma: Yes


Hx Bronchitis: Yes


Hx Chronic Obstructive Pulmonary Disease (COPD): Yes





- NEUROLOGICAL


Hx Neurological Disorder: No


Hx Dizziness: Yes





- HEENT


Hx HEENT Problems: Yes


Hx Cataracts: Yes (cataract surgery 3 years ago?)


Hx Glaucoma: No


Other/Comment: wear eyeglasses





- RENAL


Hx Chronic Kidney Disease: No





- ENDOCRINE/METABOLIC


Hx Hypothyroidism: Yes





- HEMATOLOGICAL/ONCOLOGICAL


Hx Blood Disorders: No





- INTEGUMENTARY


Hx Dermatological Problems: No





- MUSCULOSKELETAL/RHEUMATOLOGICAL


Hx Arthritis: Yes


Hx Osteoporosis: Yes





- GASTROINTESTINAL


Hx Gastrointestinal Disorders: No





- GENITOURINARY/GYNECOLOGICAL


Hx Genitourinary Disorders: No





- PSYCHIATRIC


Hx Substance Use: No





- SURGICAL HISTORY


Hx Surgeries: Yes


Hx Orthopedic Surgery: Yes (left arm surgery 8 yrs ago)





- ANESTHESIA


Hx Anesthesia: Yes


Hx Anesthesia Reactions: No


Hx Malignant Hyperthermia: No





Meds


Allergies/Adverse Reactions: 


 Allergies











Allergy/AdvReac Type Severity Reaction Status Date / Time


 


No Known Allergies Allergy   Verified 02/05/18 21:47














Results





- Vital Signs


Recent Vital Signs: 





 Last Vital Signs











Temp  97.9 F   02/06/18 13:24


 


Pulse  76   02/06/18 13:24


 


Resp  17   02/06/18 13:24


 


BP  131/61   02/06/18 13:24


 


Pulse Ox  99   02/06/18 13:24














- Labs


Result Diagrams: 


 02/05/18 23:18





 02/05/18 23:18


Labs: 





 Laboratory Results - last 24 hr











  02/05/18 02/05/18 02/06/18





  23:18 23:18 02:02


 


WBC  10.4  


 


RBC  3.59 L  


 


Hgb  11.2  


 


Hct  31.8 L  


 


MCV  88.6  


 


MCH  31.1 H  


 


MCHC  35.1  


 


RDW  14.8 H  


 


Plt Count  173  


 


MPV  9.3  


 


Neut % (Auto)  67.4  


 


Lymph % (Auto)  21.0  


 


Mono % (Auto)  9.5  


 


Eos % (Auto)  1.5  


 


Baso % (Auto)  0.6  


 


Neut # (Auto)  7.0  


 


Lymph # (Auto)  2.2  


 


Mono # (Auto)  1.0 H  


 


Eos # (Auto)  0.2  


 


Baso # (Auto)  0.1  


 


PT   


 


INR   


 


APTT   


 


Puncture Site    Rr


 


pCO2    27 L


 


pO2    74 L


 


HCO3    20.0 L


 


ABG pH    7.41


 


ABG Total CO2    17.9 L


 


ABG O2 Saturation    97.9


 


ABG Base Excess    -6.3 L


 


ABG Hemoglobin    11.3 L


 


ABG Carboxyhemoglobin    1.9 H


 


POC ABG HHb (Measured)    2.0


 


ABG Methemoglobin    1.3


 


Jj Test    Pos


 


A-a O2 Difference    42.0


 


Respiratory Index    0.6


 


Hgb O2 Saturation    94.8 L


 


FiO2    21.0


 


Sodium   134 


 


Potassium   4.2 


 


Chloride   106 


 


Carbon Dioxide   16 L 


 


Anion Gap   17 


 


BUN   26 H 


 


Creatinine   1.3 H 


 


Est GFR ( Amer)   48 


 


Est GFR (Non-Af Amer)   39 


 


POC Glucose (mg/dL)   


 


Random Glucose   81 


 


Calcium   8.7 


 


Total Bilirubin   0.5 


 


AST   34 


 


ALT   37 


 


Alkaline Phosphatase   50 


 


Total Creatine Kinase   


 


CK-MB (Mass)   


 


Troponin I   


 


Total Protein   7.2 


 


Albumin   3.7 


 


Globulin   3.4 


 


Albumin/Globulin Ratio   1.1 














  02/06/18 02/06/18 02/06/18





  02:36 07:37 11:35


 


WBC   


 


RBC   


 


Hgb   


 


Hct   


 


MCV   


 


MCH   


 


MCHC   


 


RDW   


 


Plt Count   


 


MPV   


 


Neut % (Auto)   


 


Lymph % (Auto)   


 


Mono % (Auto)   


 


Eos % (Auto)   


 


Baso % (Auto)   


 


Neut # (Auto)   


 


Lymph # (Auto)   


 


Mono # (Auto)   


 


Eos # (Auto)   


 


Baso # (Auto)   


 


PT   


 


INR   


 


APTT   


 


Puncture Site   


 


pCO2   


 


pO2   


 


HCO3   


 


ABG pH   


 


ABG Total CO2   


 


ABG O2 Saturation   


 


ABG Base Excess   


 


ABG Hemoglobin   


 


ABG Carboxyhemoglobin   


 


POC ABG HHb (Measured)   


 


ABG Methemoglobin   


 


Jj Test   


 


A-a O2 Difference   


 


Respiratory Index   


 


Hgb O2 Saturation   


 


FiO2   


 


Sodium   


 


Potassium   


 


Chloride   


 


Carbon Dioxide   


 


Anion Gap   


 


BUN   


 


Creatinine   


 


Est GFR ( Amer)   


 


Est GFR (Non-Af Amer)   


 


POC Glucose (mg/dL)   72 


 


Random Glucose   


 


Calcium   


 


Total Bilirubin   


 


AST   


 


ALT   


 


Alkaline Phosphatase   


 


Total Creatine Kinase  130   116


 


CK-MB (Mass)  1.66   1.76


 


Troponin I  0.0470   0.0420


 


Total Protein   


 


Albumin   


 


Globulin   


 


Albumin/Globulin Ratio   














  02/06/18 02/06/18





  11:35 11:51


 


WBC  


 


RBC  


 


Hgb  


 


Hct  


 


MCV  


 


MCH  


 


MCHC  


 


RDW  


 


Plt Count  


 


MPV  


 


Neut % (Auto)  


 


Lymph % (Auto)  


 


Mono % (Auto)  


 


Eos % (Auto)  


 


Baso % (Auto)  


 


Neut # (Auto)  


 


Lymph # (Auto)  


 


Mono # (Auto)  


 


Eos # (Auto)  


 


Baso # (Auto)  


 


PT  13.5 H 


 


INR  1.2 


 


APTT  29 


 


Puncture Site  


 


pCO2  


 


pO2  


 


HCO3  


 


ABG pH  


 


ABG Total CO2  


 


ABG O2 Saturation  


 


ABG Base Excess  


 


ABG Hemoglobin  


 


ABG Carboxyhemoglobin  


 


POC ABG HHb (Measured)  


 


ABG Methemoglobin  


 


Jj Test  


 


A-a O2 Difference  


 


Respiratory Index  


 


Hgb O2 Saturation  


 


FiO2  


 


Sodium  


 


Potassium  


 


Chloride  


 


Carbon Dioxide  


 


Anion Gap  


 


BUN  


 


Creatinine  


 


Est GFR ( Amer)  


 


Est GFR (Non-Af Amer)  


 


POC Glucose (mg/dL)   152 H


 


Random Glucose  


 


Calcium  


 


Total Bilirubin  


 


AST  


 


ALT  


 


Alkaline Phosphatase  


 


Total Creatine Kinase  


 


CK-MB (Mass)  


 


Troponin I  


 


Total Protein  


 


Albumin  


 


Globulin  


 


Albumin/Globulin Ratio

## 2018-02-06 NOTE — CT
EXAM:

  CT Chest Without Intravenous Contrast



CLINICAL HISTORY:

  82 years old, female; Pain; Chest pain; Patient HX: 1-24-18; Additional info: 

Widened mediastinum



TECHNIQUE:

  Axial computed tomography images of the chest without intravenous contrast.  

All CT scans at this facility use one or more dose reduction techniques, viz.: 

automated exposure control; ma/kV adjustment per patient size (including 

targeted exams where dose is matched to indication; i.e. head); or iterative 

reconstruction technique.

  Coronal and sagittal reformatted images were created and reviewed.



COMPARISON:

  CT - CHEST W/O CONTRAST 2018-01-24 16:57



FINDINGS:

  Limitations:  Lack of intravenous contrast.  Motion artifact - mild.

  Lungs:  Mosaic pattern of lung parenchyma with scattered groundglass 

opacities, slightly decreased from previous examination.  Interlobular septal 

thickening.  Mild atelectasis/scarring.

  Pleural space:  No pneumothorax.  No significant effusion.

  Heart:  Mild cardiomegaly.  No significant pericardial effusion.  Coronary 

artery calcifications.  Valvular calcifications.

  Thyroid:  Few small calcifications. Stable 1.5 cm nodule within isthmus.

  Bones/joints:  Degenerative changes of spine.  No acute fracture.

  Soft tissues:  Unremarkable.

  Vasculature:  Mild-to-moderate atherosclerotic disease.  No aneurysm.

  Lymph nodes:  Several subcentimeter short axis mediastinal lymph nodes.

  Adrenals:  Mild hypertrophy of adrenal glands.



IMPRESSION:     

1.  Mosaic pattern of lung parenchyma with groundglass opacities and 

interlobular septal thickening, nonspecific. DDX: Interstitial edema, 

interstitial pneumonia, interstitial lung disease. Clinical correlation is 

needed.

2.  Thyroid nodule.  Followup as clinically warranted.

3.  Incidental/non-acute findings are described above.

## 2018-02-06 NOTE — RAD
Chest x-ray single frontal view 



History: Dizziness. 



Comparison: 01/22/2018 



Findings: 



Diffuse increased interstitial lung markings suggestive for 

underlying infiltrate and or edema.  Clinical correlation. 



Patchy increased markings at the lung bases. 



Cardiomegaly. 



Tortuous ectatic aorta. 



Scattered nodular densities throughout both lung fields. 



Degenerative changes in the spine with paravertebral osteophytes. 



Impression: 



Diffuse increased interstitial lung markings suggestive for 

underlying infiltrate and or edema.  Clinical correlation. 



Patchy increased markings at the lung bases. 



Cardiomegaly. 



Tortuous ectatic aorta. 



Scattered nodular densities throughout both lung fields.

## 2018-02-06 NOTE — C.PDOC
History Of Present Illness


82 year old female presents to the ER with family after patient fell at home 

due to a possible syncopal episode. As per family, patient lives alone; she was 

seen a week ago for the same complaint, was admitted and discharged home. 

Denies head injury, nausea, or vomiting.


Chief Complaint (Nursing): Dizziness/Lightheaded


History Per: Patient


History/Exam Limitations: no limitations


Onset/Duration Of Symptoms: Days


Current Symptoms Are (Timing): Still Present


Associated Symptoms Preceding Syncopal Episode: Lightheadedness


Seizure Or Post-ictal Symptoms: None


Possible Causative Factor(s): Other (Not known)


Fall Associated With With Symptoms: Yes


Recent travel outside of the [place]: No





- Symptoms Of CVA


Associated Symptoms: denies: Impaired Speech, Seizure Activity, New Vision 

Deficit(Left), New Vision Deficit(Right), Decreased Ability To Walk, New 

Confusion





Past Medical History


Reviewed: Historical Data, Nursing Documentation, Vital Signs


Vital Signs: 


 Last Vital Signs











Temp  98.5 F   02/05/18 21:32


 


Pulse  104 H  02/05/18 21:32


 


Resp  18   02/05/18 21:32


 


BP  126/71   02/05/18 21:32


 


Pulse Ox  97   02/06/18 01:22














- Medical History


PMH: Arthritis, Asthma, Bronchitis, CAD, CHF, COPD, Diabetes, HTN, 

Hypercholesterolemia, Hypothyroidism, Osteoporosis, Peripheral Edema





- [facility] Procedures








ASSISTANCE WITH RESPIRATORY VENTILATION, <24 HRS, CPAP (04/28/16)


INSERTION OF ENDOTRACHEAL AIRWAY INTO TRACHEA, VIA OPENING (04/28/16)


RESPIRATORY VENTILATION, GREATER THAN 96 CONSECUTIVE HOURS (04/28/16)








Family History: States: Unknown Family Hx





- Social History


Hx Tobacco Use: No


Hx Alcohol Use: No


Hx Substance Use: No





- Immunization History


Hx Tetanus Toxoid Vaccination: No


Hx Influenza Vaccination: Yes


Hx Pneumococcal Vaccination: No





Review Of Systems


Constitutional: Negative for: Fever, Chills


Cardiovascular: Negative for: Chest Pain, Palpitations


Respiratory: Negative for: Shortness of Breath


Gastrointestinal: Negative for: Nausea, Vomiting


Neurological: Positive for: Other (Possible syncopal episode)





Physical Exam





- Physical Exam


Appears: Non-toxic, No Acute Distress, Other (Alert, Conscious)


Skin: Normal Color, Warm, Dry


Head: Atraumatic, Normacephalic


Eye(s): bilateral: Normal Inspection, PERRL, EOMI


Oral Mucosa: Moist


Neck: Normal, Supple


Chest: Symmetrical, No Tenderness


Cardiovascular: Rhythm Regular


Respiratory: Normal Breath Sounds, No Rales, No Rhonchi, No Wheezing


Gastrointestinal/Abdominal: Soft, No Tenderness


Extremity: Tenderness (Bilateral ankles), No Deformity, No Swelling


Neurological/Psych: Oriented x3, Normal Speech, Other (No focal deficits)





ED Course And Treatment





- Laboratory Results


Result Diagrams: 


 02/05/18 23:18





 02/05/18 23:18


O2 Sat by Pulse Oximetry: 97 (Room air)


Pulse Ox Interpretation: Normal


Progress Note: CT chest, CT head, EKG, CXR, bilateral ankle x-ray, and blood 

work ordered. Toradol administered.





Disposition


Discussed With Dr.: Alex Ribera


Doctor Will See Patient In The: Hospital


Counseled Patient/Family Regarding: Diagnosis





- Disposition


Disposition: HOSPITALIZED


Disposition Time: 01:41


Condition: STABLE


Forms:  [facility] (English)





- POA


Present On Arrival: None





- Clinical Impression


Clinical Impression: 


 Syncope, Renal insufficiency








- Scribe Statement


The provider has reviewed the documentation as recorded by the Scribmyles Wiley





All medical record entries made by the Abiolaibmyles were at my direction and 

personally dictated by me. I have reviewed the chart and agree that the record 

accurately reflects my personal performance of the history, physical exam, 

medical decision making, and the department course for this patient. I have 

also personally directed, reviewed, and agree with the discharge instructions 

and disposition.

## 2018-02-07 VITALS — RESPIRATION RATE: 20 BRPM

## 2018-02-07 LAB
ALBUMIN SERPL-MCNC: 3.3 G/DL (ref 3.5–5)
ALBUMIN/GLOB SERPL: 1 {RATIO} (ref 1–2.1)
ALT SERPL-CCNC: 33 U/L (ref 9–52)
AST SERPL-CCNC: 31 U/L (ref 14–36)
BASOPHILS # BLD AUTO: 0 K/UL (ref 0–0.2)
BASOPHILS NFR BLD: 0.8 % (ref 0–2)
BUN SERPL-MCNC: 16 MG/DL (ref 7–17)
C DIFF DNA SPEC QL NAA+PROBE: NEGATIVE
CALCIUM SERPL-MCNC: 9 MG/DL (ref 8.6–10.4)
EOSINOPHIL # BLD AUTO: 0.2 K/UL (ref 0–0.7)
EOSINOPHIL NFR BLD: 2.6 % (ref 0–4)
ERYTHROCYTE [DISTWIDTH] IN BLOOD BY AUTOMATED COUNT: 14.5 % (ref 11.5–14.5)
GFR NON-AFRICAN AMERICAN: 53
HGB BLD-MCNC: 11 G/DL (ref 11–16)
LYMPHOCYTES # BLD AUTO: 1.6 K/UL (ref 1–4.3)
LYMPHOCYTES NFR BLD AUTO: 25.5 % (ref 20–40)
MCH RBC QN AUTO: 31 PG (ref 27–31)
MCHC RBC AUTO-ENTMCNC: 34.9 G/DL (ref 33–37)
MCV RBC AUTO: 88.8 FL (ref 81–99)
MONOCYTES # BLD: 0.6 K/UL (ref 0–0.8)
MONOCYTES NFR BLD: 10.5 % (ref 0–10)
NEUTROPHILS # BLD: 3.7 K/UL (ref 1.8–7)
NEUTROPHILS NFR BLD AUTO: 60.6 % (ref 50–75)
NRBC BLD AUTO-RTO: 0 % (ref 0–2)
PLATELET # BLD: 154 K/UL (ref 130–400)
PMV BLD AUTO: 9.7 FL (ref 7.2–11.7)
RBC # BLD AUTO: 3.53 MIL/UL (ref 3.8–5.2)
WBC # BLD AUTO: 6.1 K/UL (ref 4.8–10.8)
WBC STL QL MICRO: NEGATIVE

## 2018-02-07 RX ADMIN — PANTOPRAZOLE SODIUM SCH MG: 40 TABLET, DELAYED RELEASE ORAL at 09:33

## 2018-02-07 RX ADMIN — CILOSTAZOL SCH MG: 50 TABLET ORAL at 19:35

## 2018-02-07 RX ADMIN — HUMAN INSULIN SCH: 100 INJECTION, SOLUTION SUBCUTANEOUS at 16:30

## 2018-02-07 RX ADMIN — HUMAN INSULIN SCH: 100 INJECTION, SOLUTION SUBCUTANEOUS at 08:06

## 2018-02-07 RX ADMIN — HUMAN INSULIN SCH: 100 INJECTION, SOLUTION SUBCUTANEOUS at 21:55

## 2018-02-07 RX ADMIN — CILOSTAZOL SCH MG: 50 TABLET ORAL at 09:38

## 2018-02-07 RX ADMIN — Medication SCH TAB: at 09:34

## 2018-02-07 RX ADMIN — HUMAN INSULIN SCH: 100 INJECTION, SOLUTION SUBCUTANEOUS at 12:14

## 2018-02-07 NOTE — CARD
--------------- APPROVED REPORT --------------





EKG Measurement

Heart [id number]

NE [id number]

[id number]

[id number]

[id number]



<Conclusion>

Sinus rhythm with premature atrial complexes

Left ventricular hypertrophy with repolarization abnormality

Abnormal ECG

## 2018-02-07 NOTE — CP.PCM.PN
Subjective





- Date & Time of Evaluation


Date of Evaluation: 02/07/18


Time of Evaluation: 13:12





- Subjective


Subjective: 





CHIEF COMPLAINTS TODAY :


GEN WEAKNESS 


UNABLE TO STAND 





ROS.


HEENT :  N.


Resp :       No cough, wheezing ,pleuritic CP ,or hemoptysis 


Cardio :     No anginal  CP, PND, orthopnea, palpitation 


GI :           No abd.pain, n/v ,diarrhea or GI bleeding .


CNS : No headache, vertigo, focal deficit.


Musculoskel :  No joint swelling ,


Derm :        No rash 


Psych :     Normal affect.


Ext :  No  swelling ,calf pain 





PE.


Pt. is alert awake in no distress.


V.S  As noted in the chart 


Head ,ear nose,throat and eyes : Normal.


Neck : Supple with normal carotids.


Lungs: Clear air entry.


Heart : S1 & S2 normal with S4. No murmur.


Abd : Soft non tender with normal bowel sounds.


Neuro : Moves all ext. with no localized deficit.


Ext : No edema with intact pulses.Non tender calves 


Derm : No rashes or decubitus ulcer.





LABS/RADIOLOGY:





ASSESSMENT/PLAN : 


ID EVAL FOR TB 


MATTHEW 











Objective





- Vital Signs/Intake and Output


Vital Signs (last 24 hours): 


 











Temp Pulse Resp BP Pulse Ox


 


 97.8 F   82   18   120/63   97 


 


 02/07/18 07:35  02/07/18 09:36  02/07/18 07:35  02/07/18 09:36  02/07/18 07:35











- Medications


Medications: 


 Current Medications





Aspirin (Aspirin Chewable)  81 mg PO DAILY [facility]


   Last Admin: 02/07/18 09:34 Dose:  81 mg


Cilostazol (Pletal)  50 mg PO BID [facility]


   Last Admin: 02/07/18 09:38 Dose:  50 mg


Ferrous Sulfate (Feosol)  325 mg PO DAILY [facility]


   Last Admin: 02/07/18 09:34 Dose:  325 mg


Gabapentin (Neurontin)  300 mg PO DAILY [facility]


   Last Admin: 02/07/18 09:34 Dose:  300 mg


Heparin Sodium (Porcine) (Heparin)  5,000 units SC Q12 [facility]


   Last Admin: 02/07/18 09:40 Dose:  5,000 units


Home Med (Azelastine Hcl [Azelastine Hcl])  0.05 % OU BID [facility]


Hydrochlorothiazide (Hydrodiuril)  25 mg PO DAILY [facility]


   Last Admin: 02/07/18 09:35 Dose:  25 mg


Insulin Human Regular (Novolin R)  0 unit SC ACHS [facility]


   PRN Reason: Protocol


   Last Admin: 02/07/18 12:14 Dose:  Not Given


Losartan Potassium (Cozaar)  100 mg PO DAILY [facility]


   Last Admin: 02/07/18 09:35 Dose:  100 mg


Metformin HCl (Glucophage)  1,000 mg PO BIDCC [facility]


   Last Admin: 02/07/18 08:34 Dose:  1,000 mg


Metoprolol Tartrate (Lopressor)  50 mg PO BID [facility]


   Last Admin: 02/07/18 09:35 Dose:  50 mg


Multivitamins (Hexavitamin)  1 tab PO DAILY [facility]


   Last Admin: 02/07/18 09:34 Dose:  1 tab


Pantoprazole Sodium (Protonix Ec Tab)  40 mg PO DAILY [facility]


   Last Admin: 02/07/18 09:33 Dose:  40 mg


Rosuvastatin Calcium (Crestor)  5 mg PO HS [facility]


   Last Admin: 02/06/18 21:41 Dose:  5 mg


Sitagliptin Phosphate (Januvia)  100 mg PO DAILY [facility]


   Last Admin: 02/07/18 09:33 Dose:  100 mg











- Labs


Labs: 


 





 02/07/18 07:20 





 02/07/18 07:20 





 











PT  13.5 SECONDS (9.7-12.2)  H  02/06/18  11:35    


 


INR  1.2   02/06/18  11:35    


 


APTT  29 SECONDS (21-34)   02/06/18  11:35

## 2018-02-07 NOTE — CP.PCM.PN
Subjective





- Date & Time of Evaluation


Date of Evaluation: 02/07/18


Time of Evaluation: 22:59





- Subjective


Subjective: 





CHIEF COMPLAINTS TODAY :


afebrile,


c/o generalized abdominal pain and bloating.


diarrhea positive


UNABLE TO STAND 





ROS.


HEENT :  N.


Resp :       No cough, wheezing ,pleuritic CP ,or hemoptysis 


Cardio :     No anginal  CP, PND, orthopnea, palpitation 


GI :           +ve ABD. PAIN, NO n/v , +VE diarrhea. NO GI bleeding .


CNS : No headache, vertigo, focal deficit.


Musculoskel :  No joint swelling ,


Derm :        No rash 


Psych :     Normal affect.


Ext :  No  swelling ,calf pain 





PE.


Pt. is alert awake in no distress.


V.S  As noted in the chart 


Head ,ear nose,throat and eyes : Normal.


Neck : Supple with normal carotids.


Lungs: Clear air entry.


Heart : S1 & S2 normal with S4. No murmur.


Abd : Soft SLIGHTLY DISTENDED.  gENERALIZED TENDERNESS ESPECIALLY EPIGASTRIC 

AND MID ABDOMEN.


bOWEL SOUNDS PRESENT.


Neuro : Moves all ext. with no localized deficit.


Ext : No edema with intact pulses.Non tender calves 


Derm : No rashes or decubitus ulcer.





LABS/RADIOLOGY:


CT ABD W PO CONTRAST 2/7/18 -VE














 








Objective





- Vital Signs/Intake and Output


Vital Signs (last 24 hours): 


 











Temp Pulse Resp BP Pulse Ox


 


 97.5 F L  78   20   106/57 L  95 


 


 02/07/18 16:54  02/07/18 16:54  02/07/18 16:54  02/07/18 16:54  02/07/18 16:54











- Medications


Medications: 


 Current Medications





Aspirin (Aspirin Chewable)  81 mg PO DAILY [facility]


   Last Admin: 02/07/18 09:34 Dose:  81 mg


Cilostazol (Pletal)  50 mg PO BID [facility]


   Last Admin: 02/07/18 19:35 Dose:  50 mg


Ferrous Sulfate (Feosol)  325 mg PO DAILY [facility]


   Last Admin: 02/07/18 09:34 Dose:  325 mg


Gabapentin (Neurontin)  300 mg PO DAILY [facility]


   Last Admin: 02/07/18 09:34 Dose:  300 mg


Heparin Sodium (Porcine) (Heparin)  5,000 units SC Q12 [facility]


   Last Admin: 02/07/18 21:17 Dose:  5,000 units


Home Med (Azelastine Hcl [Azelastine Hcl])  0.05 % OU BID [facility]


Hydrochlorothiazide (Hydrodiuril)  25 mg PO DAILY [facility]


   Last Admin: 02/07/18 09:35 Dose:  25 mg


Insulin Human Regular (Novolin R)  0 unit SC ACHS [facility]


   PRN Reason: Protocol


   Last Admin: 02/07/18 21:55 Dose:  Not Given


Losartan Potassium (Cozaar)  100 mg PO DAILY [facility]


   Last Admin: 02/07/18 09:35 Dose:  100 mg


[facility]


   Last Admin: 02/07/18 17:40 Dose:  1,000 mg


Metoprolol Tartrate (Lopressor)  50 mg PO BID [facility]


   Last Admin: 02/07/18 19:34 Dose:  50 mg


Multivitamins (Hexavitamin)  1 tab PO DAILY [facility]


   Last Admin: 02/07/18 09:34 Dose:  1 tab


Pantoprazole Sodium (Protonix Ec Tab)  40 mg PO DAILY [facility]


   Last Admin: 02/07/18 09:33 Dose:  40 mg


Rosuvastatin Calcium (Crestor)  5 mg PO HS [facility]


   Last Admin: 02/07/18 21:18 Dose:  5 mg


Sitagliptin Phosphate (Januvia)  100 mg PO DAILY [facility]


   Last Admin: 02/07/18 09:33 Dose:  100 mg











- Labs


Labs: 


 





 02/07/18 07:20 





 02/07/18 07:20 





 











PT  13.5 SECONDS (9.7-12.2)  H  02/06/18  11:35    


 


INR  1.2   02/06/18  11:35    


 


APTT  29 SECONDS (21-34)   02/06/18  11:35    














Assessment and Plan


(1) Syncope


Status: Acute   





(2) Interstitial lung disease


Status: Acute   





(3) Congestive heart failure


Status: Acute   





(4) Dizziness


Status: Acute   





(5) History of fall


Status: Acute   





- Assessment and Plan (Free Text)


Plan: 





W/U FOR TB IS NEGATIVE, WITH NEGATIVE qUANTIferon gOLD tb TEST.


SPUTUM NEGATIVE SMEARS FOR afb 3 -PENDING CULTURES


CT OF THE CHEST 2/6/18 REVIEWED CONSISTENT WITH PROBABLY CHRONIC NONSPECIFIC 

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





PATIENT PRESENTLY COMPLAINING OF ABDOMINAL PAINS,AND BLOATING.


PATIENT ALREADY STARTED ON pROTONIX.


STOOLS FOR C. DIFFICILE AS RECENT HISTORY OF ANTIBIOTIC THERAPY.


CT OF THE ABDOMEN AND PELVIS WITH BY MOUTH CONTRAST  NOTED-NO ACUTE PATHOLOGY.


cONSIDER gi WORKUP FOR GENERALIZED WEAKNESS, FATIGUE AND POSSIBLE COLITIS

## 2018-02-07 NOTE — CT
PROCEDURE:  CT Abdomen and Pelvis without intravenous contrast



HISTORY:

ABDOMINAL PAIN R/O MASS VS COLITIS



COMPARISON:

4/28/2016



TECHNIQUE:

Without contrast.. 



Contrast Dose: 0



Radiation dose:



Total exam DLP = 719.36 mGy-cm.



This CT exam was performed using one or more of the following dose 

reduction techniques: Automated exposure control, adjustment of the 

mA and/or kV according to patient size, and/or use of iterative 

reconstruction technique.



FINDINGS:



LOWER THORAX:

Cardiomegaly.  Chronic interstitial fibrotic changes at both lung 

bases. Mitral annular calcification. 



LIVER:

Unremarkable. No gross lesion or ductal dilatation.  



GALLBLADDER AND BILE DUCTS:

Unremarkable. 



PANCREAS:

Unremarkable. No gross lesion or ductal dilatation.



SPLEEN:

Unremarkable. 



ADRENALS:

Unremarkable. No mass. 



KIDNEYS AND URETERS:

1.6 cm left upper pole renal cortical cyst, unchanged.  This measures 

4 Hounsfield units.  No other renal mass. No renal calculus or 

hydronephrosis. 



VASCULATURE:

Unremarkable. No aortic aneurysm. 



BOWEL:

Unremarkable. No obstruction. No gross mural thickening. 



APPENDIX:

Unremarkable. Normal appendix. 



PERITONEUM:

Unremarkable. No free fluid. No free air. 



LYMPH NODES:

Unremarkable. No enlarged lymph nodes. 



BLADDER:

Unremarkable. 



REPRODUCTIVE:

Unremarkable uterus 



BONES:

No acute fracture. Grade 1 anterolisthesis at L4-5 likely 

degenerative in origin. 



OTHER FINDINGS:

None.



IMPRESSION:

No acute abnormality.  Minor findings as above

## 2018-02-08 LAB
ALBUMIN SERPL-MCNC: 3.8 G/DL (ref 3.5–5)
ALBUMIN/GLOB SERPL: 1 {RATIO} (ref 1–2.1)
ALT SERPL-CCNC: 42 U/L (ref 9–52)
AST SERPL-CCNC: 32 U/L (ref 14–36)
BASOPHILS # BLD AUTO: 0.1 K/UL (ref 0–0.2)
BASOPHILS NFR BLD: 0.8 % (ref 0–2)
BUN SERPL-MCNC: 10 MG/DL (ref 7–17)
CALCIUM SERPL-MCNC: 9.3 MG/DL (ref 8.6–10.4)
EOSINOPHIL # BLD AUTO: 0.2 K/UL (ref 0–0.7)
EOSINOPHIL NFR BLD: 2.2 % (ref 0–4)
ERYTHROCYTE [DISTWIDTH] IN BLOOD BY AUTOMATED COUNT: 14.5 % (ref 11.5–14.5)
GFR NON-AFRICAN AMERICAN: > 60
HGB BLD-MCNC: 12.4 G/DL (ref 11–16)
LYMPHOCYTES # BLD AUTO: 1.9 K/UL (ref 1–4.3)
LYMPHOCYTES NFR BLD AUTO: 27.2 % (ref 20–40)
MCH RBC QN AUTO: 31.5 PG (ref 27–31)
MCHC RBC AUTO-ENTMCNC: 35.2 G/DL (ref 33–37)
MCV RBC AUTO: 89.6 FL (ref 81–99)
MONOCYTES # BLD: 0.8 K/UL (ref 0–0.8)
MONOCYTES NFR BLD: 12.1 % (ref 0–10)
NEUTROPHILS # BLD: 3.9 K/UL (ref 1.8–7)
NEUTROPHILS NFR BLD AUTO: 57.7 % (ref 50–75)
NRBC BLD AUTO-RTO: 0.2 % (ref 0–2)
PLATELET # BLD: 163 K/UL (ref 130–400)
PMV BLD AUTO: 10.5 FL (ref 7.2–11.7)
RBC # BLD AUTO: 3.92 MIL/UL (ref 3.8–5.2)
WBC # BLD AUTO: 6.8 K/UL (ref 4.8–10.8)

## 2018-02-08 RX ADMIN — Medication SCH TAB: at 10:41

## 2018-02-08 RX ADMIN — HUMAN INSULIN SCH: 100 INJECTION, SOLUTION SUBCUTANEOUS at 22:01

## 2018-02-08 RX ADMIN — HUMAN INSULIN SCH: 100 INJECTION, SOLUTION SUBCUTANEOUS at 07:54

## 2018-02-08 RX ADMIN — HUMAN INSULIN SCH UNIT: 100 INJECTION, SOLUTION SUBCUTANEOUS at 11:45

## 2018-02-08 RX ADMIN — HUMAN INSULIN SCH: 100 INJECTION, SOLUTION SUBCUTANEOUS at 16:56

## 2018-02-08 RX ADMIN — PANTOPRAZOLE SODIUM SCH MG: 40 TABLET, DELAYED RELEASE ORAL at 10:41

## 2018-02-08 RX ADMIN — CILOSTAZOL SCH MG: 50 TABLET ORAL at 10:41

## 2018-02-08 RX ADMIN — CILOSTAZOL SCH MG: 50 TABLET ORAL at 18:16

## 2018-02-08 NOTE — CP.PCM.PN
Subjective





- Date & Time of Evaluation


Date of Evaluation: 02/08/18


Time of Evaluation: 22:28





- Subjective


Subjective: 





CHIEF COMPLAINTS TODAY :


afebrile,


FEELING BETTER,


DIARRHEA probably secondary to contrast


feels weak though 





ROS.


HEENT :  N.


Resp :       No cough, wheezing ,pleuritic CP ,or hemoptysis 


Cardio :     No anginal  CP, PND, orthopnea, palpitation 


GI :           +ve ABD. PAIN, NO n/v , +VE diarrhea. NO GI bleeding .


CNS : No headache, vertigo, focal deficit.


Musculoskel :  No joint swelling ,


Derm :        No rash 


Psych :     Normal affect.


Ext :  No  swelling ,calf pain 





PE.


Pt. is alert awake in no distress.


V.S  As noted in the chart 


Head ,ear nose,throat and eyes : Normal.


Neck : Supple with normal carotids.


Lungs: Clear air entry.


Heart : S1 & S2 normal with S4. No murmur.


Abd : Soft LESS DISTENDED.  bOWEL SOUNDS PRESENT.


Neuro : Moves all ext. with no localized deficit.


Ext : No edema with intact pulses.Non tender calves 


Derm : No rashes or decubitus ulcer.





LABS/RADIOLOGY:


STOOLS c. DIFFICILE NEGATIVE.


STOOL NEGATIVE FOR OCCULT BLOOD, NEGATIVE FOR LEUKOCYTES.


CT ABD W PO CONTRAST 2/7/18 -VE





Objective





- Vital Signs/Intake and Output


Vital Signs (last 24 hours): 


 











Temp Pulse Resp BP Pulse Ox


 


 97.2 F L  84   20   118/58 L  97 


 


 02/08/18 16:11  02/08/18 16:11  02/08/18 16:11  02/08/18 16:11  02/08/18 16:11








Intake and Output: 


 











 02/08/18 02/09/18





 18:59 06:59


 


Intake Total 240 


 


Balance 240 














- Medications


Medications: 


 Current Medications





Aspirin (Aspirin Chewable)  81 mg PO DAILY [facility]


   Last Admin: 02/08/18 10:41 Dose:  81 mg


Cilostazol (Pletal)  50 mg PO BID [facility]


   Last Admin: 02/08/18 18:16 Dose:  50 mg


Ferrous Sulfate (Feosol)  325 mg PO DAILY [facility]


   Last Admin: 02/08/18 10:41 Dose:  325 mg


Gabapentin (Neurontin)  300 mg PO DAILY [facility]


   Last Admin: 02/08/18 10:41 Dose:  300 mg


Heparin Sodium (Porcine) (Heparin)  5,000 units SC Q12 [facility]


   Last Admin: 02/08/18 21:35 Dose:  5,000 units


Hydrochlorothiazide (Hydrodiuril)  25 mg PO DAILY [facility]


   Last Admin: 02/08/18 10:41 Dose:  25 mg


Insulin Human Regular (Novolin R)  0 unit SC ACHS [facility]


   PRN Reason: Protocol


   Last Admin: 02/08/18 22:01 Dose:  Not Given


Losartan Potassium (Cozaar)  100 mg PO DAILY [facility]


   Last Admin: 02/08/18 10:41 Dose:  100 mg


Metformin HCl (Glucophage)  1,000 mg PO BIDCC [facility]


   Last Admin: 02/08/18 18:00 Dose:  1,000 mg


Metoprolol Tartrate (Lopressor)  50 mg PO BID [facility]


   Last Admin: 02/08/18 18:15 Dose:  50 mg


Multivitamins (Hexavitamin)  1 tab PO DAILY [facility]


   Last Admin: 02/08/18 10:41 Dose:  1 tab


Naphazoline HCl/Pheniramine Maleate (Naphcon-A Opht)  0 ml OU BID [facility]


Pantoprazole Sodium (Protonix Ec Tab)  40 mg PO DAILY [facility]


   Last Admin: 02/08/18 10:41 Dose:  40 mg


Rosuvastatin Calcium (Crestor)  5 mg PO HS [facility]


   Last Admin: 02/08/18 21:35 Dose:  5 mg


Sitagliptin Phosphate (Januvia)  100 mg PO DAILY [facility]


   Last Admin: 02/08/18 10:41 Dose:  100 mg











- Labs


Labs: 


 





 02/08/18 09:50 





 02/08/18 08:09 





 











PT  13.5 SECONDS (9.7-12.2)  H  02/06/18  11:35    


 


INR  1.2   02/06/18  11:35    


 


APTT  29 SECONDS (21-34)   02/06/18  11:35    














Assessment and Plan


(1) Syncope


Status: Acute   





(2) Interstitial lung disease


Status: Acute   





(3) Congestive heart failure


Status: Acute   





(4) Dizziness


Status: Acute   





(5) History of fall


Status: Acute   





- Assessment and Plan (Free Text)


Assessment: 





IMPROVING.





pATIENT AWAITING DISCHARGE TO REHABILITATION


PT AS PER FAMILY.

## 2018-02-08 NOTE — CP.PCM.PN
Subjective





- Date & Time of Evaluation


Date of Evaluation: 02/08/18


Time of Evaluation: 13:30





- Subjective


Subjective: 





CHIEF COMPLAINTS TODAY :


GEN WEAKNESS 


UNABLE TO STAND 





ROS.


HEENT :  N.


Resp :       No cough, wheezing ,pleuritic CP ,or hemoptysis 


Cardio :     No anginal  CP, PND, orthopnea, palpitation 


GI :           No abd.pain, n/v ,diarrhea or GI bleeding .


CNS : No headache, vertigo, focal deficit.


Musculoskel :  No joint swelling ,


Derm :        No rash 


Psych :     Normal affect.


Ext :  No  swelling ,calf pain 





PE.


Pt. is alert awake in no distress.


V.S  As noted in the chart 


Head ,ear nose,throat and eyes : Normal.


Neck : Supple with normal carotids.


Lungs: Clear air entry.


Heart : S1 & S2 normal with S4. No murmur.


Abd : Soft non tender with normal bowel sounds.


Neuro : Moves all ext. with no localized deficit.


Ext : No edema with intact pulses.Non tender calves 


Derm : No rashes or decubitus ulcer.





LABS/RADIOLOGY:





ASSESSMENT/PLAN : 


PT 


MATTHEW 





Objective





- Vital Signs/Intake and Output


Vital Signs (last 24 hours): 


 











Temp Pulse Resp BP Pulse Ox


 


 97.7 F   81   20   119/72   98 


 


 02/08/18 08:00  02/08/18 10:40  02/08/18 08:00  02/08/18 10:40  02/08/18 08:00








Intake and Output: 


 











 02/08/18 02/08/18





 11:59 23:59


 


Intake Total 0 


 


Balance 0 














- Medications


Medications: 


 Current Medications





Aspirin (Aspirin Chewable)  81 mg PO DAILY [facility]


   Last Admin: 02/08/18 10:41 Dose:  81 mg


Cilostazol (Pletal)  50 mg PO BID [facility]


   Last Admin: 02/08/18 10:41 Dose:  50 mg


Ferrous Sulfate (Feosol)  325 mg PO DAILY [facility]


   Last Admin: 02/08/18 10:41 Dose:  325 mg


Gabapentin (Neurontin)  300 mg PO DAILY [facility]


   Last Admin: 02/08/18 10:41 Dose:  300 mg


Heparin Sodium (Porcine) (Heparin)  5,000 units SC Q12 [facility]


   Last Admin: 02/08/18 10:46 Dose:  5,000 units


Home Med (Azelastine Hcl [Azelastine Hcl])  0.05 % OU BID [facility]


Hydrochlorothiazide (Hydrodiuril)  25 mg PO DAILY [facility]


   Last Admin: 02/08/18 10:41 Dose:  25 mg


Insulin Human Regular (Novolin R)  [facility]


   PRN Reason: Protocol


   Last Admin: 02/08/18 11:45 Dose:  2 unit


Losartan Potassium (Cozaar)  100 mg PO DAILY [facility]


   Last Admin: 02/08/18 10:41 Dose:  100 mg


Metformin HCl (Glucophage)  1,000 mg PO BIDCC [facility]


   Last Admin: 02/08/18 08:48 Dose:  1,000 mg


Metoprolol Tartrate (Lopressor)  50 mg PO BID [facility]


   Last Admin: 02/08/18 10:41 Dose:  50 mg


Multivitamins (Hexavitamin)  1 tab PO DAILY [facility]


   Last Admin: 02/08/18 10:41 Dose:  1 tab


Pantoprazole Sodium (Protonix Ec Tab)  40 mg PO DAILY [facility]


   Last Admin: 02/08/18 10:41 Dose:  40 mg


Rosuvastatin Calcium (Crestor)  5 mg PO HS [facility]


   Last Admin: 02/07/18 21:18 Dose:  5 mg


Sitagliptin Phosphate (Januvia)  100 mg PO DAILY [facility]


   Last Admin: 02/08/18 10:41 Dose:  100 mg











- Labs


Labs: 


 





 02/08/18 09:50 





 02/08/18 08:09 





 











PT  13.5 SECONDS (9.7-12.2)  H  02/06/18  11:35    


 


INR  1.2   02/06/18  11:35    


 


APTT  29 SECONDS (21-34)   02/06/18  11:35

## 2018-02-09 VITALS — SYSTOLIC BLOOD PRESSURE: 132 MMHG | TEMPERATURE: 98.1 F | DIASTOLIC BLOOD PRESSURE: 76 MMHG | OXYGEN SATURATION: 98 %

## 2018-02-09 VITALS — HEART RATE: 84 BPM

## 2018-02-09 LAB
ALBUMIN SERPL-MCNC: 3.5 G/DL (ref 3.5–5)
ALBUMIN/GLOB SERPL: 1 {RATIO} (ref 1–2.1)
ALT SERPL-CCNC: 40 U/L (ref 9–52)
AST SERPL-CCNC: 37 U/L (ref 14–36)
BASOPHILS # BLD AUTO: 0.1 K/UL (ref 0–0.2)
BASOPHILS NFR BLD: 0.6 % (ref 0–2)
BUN SERPL-MCNC: 16 MG/DL (ref 7–17)
CALCIUM SERPL-MCNC: 9.1 MG/DL (ref 8.6–10.4)
EOSINOPHIL # BLD AUTO: 0.2 K/UL (ref 0–0.7)
EOSINOPHIL NFR BLD: 2.5 % (ref 0–4)
ERYTHROCYTE [DISTWIDTH] IN BLOOD BY AUTOMATED COUNT: 14.4 % (ref 11.5–14.5)
GFR NON-AFRICAN AMERICAN: 36
HGB BLD-MCNC: 11.9 G/DL (ref 11–16)
LYMPHOCYTES # BLD AUTO: 2.7 K/UL (ref 1–4.3)
LYMPHOCYTES NFR BLD AUTO: 29.9 % (ref 20–40)
MCH RBC QN AUTO: 31 PG (ref 27–31)
MCHC RBC AUTO-ENTMCNC: 35 G/DL (ref 33–37)
MCV RBC AUTO: 88.8 FL (ref 81–99)
MONOCYTES # BLD: 1 K/UL (ref 0–0.8)
MONOCYTES NFR BLD: 10.5 % (ref 0–10)
NEUTROPHILS # BLD: 5.2 K/UL (ref 1.8–7)
NEUTROPHILS NFR BLD AUTO: 56.5 % (ref 50–75)
NRBC BLD AUTO-RTO: 0 % (ref 0–2)
PLATELET # BLD: 172 K/UL (ref 130–400)
PMV BLD AUTO: 10.1 FL (ref 7.2–11.7)
RBC # BLD AUTO: 3.82 MIL/UL (ref 3.8–5.2)
WBC # BLD AUTO: 9.2 K/UL (ref 4.8–10.8)

## 2018-02-09 RX ADMIN — PANTOPRAZOLE SODIUM SCH MG: 40 TABLET, DELAYED RELEASE ORAL at 10:16

## 2018-02-09 RX ADMIN — HUMAN INSULIN SCH: 100 INJECTION, SOLUTION SUBCUTANEOUS at 07:17

## 2018-02-09 RX ADMIN — HUMAN INSULIN SCH UNIT: 100 INJECTION, SOLUTION SUBCUTANEOUS at 12:20

## 2018-02-09 RX ADMIN — Medication SCH TAB: at 10:16

## 2018-02-09 RX ADMIN — CILOSTAZOL SCH MG: 50 TABLET ORAL at 10:22

## 2018-02-09 NOTE — CP.PCM.PN
Subjective





- Date & Time of Evaluation


Date of Evaluation: 02/09/18


Time of Evaluation: 10:57





- Subjective


Subjective: 


PATIENT WAS ADMITTING FOR SYNCOPE AND RENAL INSUFFICIENCY; FAMILY AT THE BEDSIDE

; AAOX3 DENIES CHEST PAIN, SOB AND NO SIGN OF DISTRESS NOTED











Objective





- Vital Signs/Intake and Output


Vital Signs (last 24 hours): 


 











Temp Pulse Resp BP Pulse Ox


 


 98.1 F   80   20   132/76   98 


 


 02/09/18 07:00  02/09/18 07:00  02/09/18 07:00  02/09/18 07:00  02/09/18 07:00








Intake and Output: 


 











 02/09/18 02/09/18





 06:59 18:59


 


Intake Total 480 


 


Balance 480 














- Medications


Medications: 


 Current Medications





Aspirin (Aspirin Chewable)  81 mg PO DAILY [facility]


   Last Admin: 02/09/18 10:16 Dose:  81 mg


Cilostazol (Pletal)  50 mg PO BID [facility]


   Last Admin: 02/09/18 10:22 Dose:  50 mg


Ferrous Sulfate (Feosol)  325 mg PO DAILY [facility]


   Last Admin: 02/09/18 10:17 Dose:  325 mg


Gabapentin (Neurontin)  300 mg PO DAILY [facility]


   Last Admin: 02/09/18 10:16 Dose:  300 mg


Heparin Sodium (Porcine) (Heparin)  5,000 units SC Q12 [facility]


   Last Admin: 02/09/18 10:17 Dose:  5,000 units


Hydrochlorothiazide (Hydrodiuril)  25 mg PO DAILY [facility]


   Last Admin: 02/09/18 10:16 Dose:  25 mg


Insulin Human Regular (Novolin R)  [facility]


   PRN Reason: Protocol


   Last Admin: 02/09/18 07:17 Dose:  Not Given


Losartan Potassium (Cozaar)  100 mg PO DAILY [facility]


   Last Admin: 02/09/18 10:17 Dose:  100 mg


[facility] (Gl[facility])  [facility]


   Last Admin: 02/09/18 08:08 Dose:  1,000 mg


Metoprolol Tartrate (Lopressor)  50 mg PO BID [facility]


   Last Admin: 02/09/18 10:17 Dose:  50 mg


Multivitamins (Hexavitamin)  1 tab PO DAILY [facility]


   Last Admin: 02/09/18 10:16 Dose:  1 tab


Naphazoline HCl/Pheniramine Maleate (Naphcon-A Opht)  0 ml OU BID [facility]


   Last Admin: 02/09/18 10:47 Dose:  1 drop


Pantoprazole Sodium (Protonix Ec Tab)  40 mg PO DAILY [facility]


   Last Admin: 02/09/18 10:16 Dose:  40 mg


Rosuvastatin Calcium (Crestor)  5 mg PO HS [facility]


   Last Admin: 02/08/18 21:35 Dose:  5 mg


Sitagliptin Phosphate (Januvia)  100 mg PO DAILY [facility]


   Last Admin: 02/09/18 10:16 Dose:  100 mg











- Labs


Labs: 


 





 02/09/18 07:14 





 02/09/18 07:14 





 











PT  13.5 SECONDS (9.7-12.2)  H  02/06/18  11:35    


 


INR  1.2   02/06/18  11:35    


 


APTT  29 SECONDS (21-34)   02/06/18  11:35    














Assessment and Plan





- Assessment and Plan (Free Text)


Assessment: 


PATIENT SEEN AND EXAMINED AT THE BEDSIDE 


LUNG SOUND CLEAR 


QUESTIONABLE CT OF THE CHEST PRIOR ADMISSION TB W/U WAS DONE AND IT WAS NEG


ABD CT TO R/O MASS AND COLITIS WAS NEG


STOOL CDIFF WAS NEG 


HEAD CT WAS NEG FOR BLEEDING 


PATIENT HAVING FORM STOOL


DISCUSS WITH DR MATIAS AND DR KING BOTH AGREE 


ADMITTING PATIENT UNDER THE FACILITY DOCTOR OR PMD AT [facility] 


FOLLOW UP WITH DR MATIAS 1-2 WEEKS AT HIS OFFICE ---CALL FOR APPOINTMENT


FOLLOW UP WITH DR KING 1-2 WEEKS ----CALL FOR APPOINTMENT


CONTINUE ALL YOUR HOME MEDICATION 


NO NEW PRESCRIPTION 


ACTIVITY AS ORDER PER ADMITTING DOCTOR AND FACILITY PROTOCOL


FOR FURTHER ORDER THE ADMITTING DOCTOR


DISCUSS WITH THE PATIENT AND FAMILY WHO AGREE AND VERBALIZED UNDERSTANDING

## 2018-02-09 NOTE — CP.PCM.DIS
Provider





- Provider


Date of Admission: 


02/06/18 01:42





Attending physician: 


Alex Ribera MD





Time Spent in preparation of Discharge (in minutes): 30





Hospital Course





- Lab Results


Lab Results: 


 Most Recent Lab Values











WBC  9.2 K/uL (4.8-10.8)   02/09/18  07:14    


 


RBC  3.82 Mil/uL (3.80-5.20)   02/09/18  07:14    


 


Hgb  11.9 g/dL (11.0-16.0)   02/09/18  07:14    


 


Hct  33.9 % (34.0-47.0)  L  02/09/18  07:14    


 


MCV  88.8 fL (81.0-99.0)   02/09/18  07:14    


 


MCH  31.0 pg (27.0-31.0)   02/09/18  07:14    


 


MCHC  35.0 g/dL (33.0-37.0)   02/09/18  07:14    


 


RDW  14.4 % (11.5-14.5)   02/09/18  07:14    


 


Plt Count  172 K/uL (130-400)   02/09/18  07:14    


 


MPV  10.1 fL (7.2-11.7)   02/09/18  07:14    


 


Neut % (Auto)  56.5 % (50.0-75.0)   02/09/18  07:14    


 


Lymph % (Auto)  29.9 % (20.0-40.0)   02/09/18  07:14    


 


Mono % (Auto)  10.5 % (0.0-10.0)  H  02/09/18  07:14    


 


Eos % (Auto)  2.5 % (0.0-4.0)   02/09/18  07:14    


 


Baso % (Auto)  0.6 % (0.0-2.0)   02/09/18  07:14    


 


Neut # (Auto)  5.2 K/uL (1.8-7.0)   02/09/18  07:14    


 


Lymph # (Auto)  2.7 K/uL (1.0-4.3)   02/09/18  07:14    


 


Mono # (Auto)  1.0 K/uL (0.0-0.8)  H  02/09/18  07:14    


 


Eos # (Auto)  0.2 K/uL (0.0-0.7)   02/09/18  07:14    


 


Baso # (Auto)  0.1 K/uL (0.0-0.2)   02/09/18  07:14    


 


PT  13.5 SECONDS (9.7-12.2)  H  02/06/18  11:35    


 


INR  1.2   02/06/18  11:35    


 


APTT  29 SECONDS (21-34)   02/06/18  11:35    


 


Puncture Site  Rr   02/06/18  02:02    


 


pCO2  27 mm/Hg (35-45)  L  02/06/18  02:02    


 


pO2  74 mm/Hg ()  L  02/06/18  02:02    


 


HCO3  20.0 mmol/L (21-28)  L  02/06/18  02:02    


 


ABG pH  7.41  (7.35-7.45)   02/06/18  02:02    


 


ABG Total CO2  17.9 mmol/L (22-28)  L  02/06/18  02:02    


 


ABG O2 Saturation  97.9 % (95-98)   02/06/18  02:02    


 


ABG Base Excess  -6.3 mmol/L (-2.0-3.0)  L  02/06/18  02:02    


 


ABG Hemoglobin  11.3 g/dL (11.7-17.4)  L  02/06/18  02:02    


 


ABG Carboxyhemoglobin  1.9 % (0.5-1.5)  H  02/06/18  02:02    


 


POC ABG HHb (Measured)  2.0 % (0.0-5.0)   02/06/18  02:02    


 


ABG Methemoglobin  1.3 % (0.0-3.0)   02/06/18  02:02    


 


Jj Test  Pos   02/06/18  02:02    


 


A-a O2 Difference  42.0 mm/Hg  02/06/18  02:02    


 


Respiratory Index  0.6   02/06/18  02:02    


 


Hgb O2 Saturation  94.8 % (95.0-98.0)  L  02/06/18  02:02    


 


FiO2  21.0 %  02/06/18  02:02    


 


Sodium  135 mmol/L (132-148)   02/09/18  07:14    


 


Potassium  4.3 mmol/L (3.6-5.2)   02/09/18  07:14    


 


Chloride  108 mmol/L ()  H  02/09/18  07:14    


 


Carbon Dioxide  18 mmol/L (22-30)  L  02/09/18  07:14    


 


Anion Gap  14  (10-20)   02/09/18  07:14    


 


BUN  16 mg/dL (7-17)   02/09/18  07:14    


 


Creatinine  1.4 mg/dL (0.7-1.2)  H  02/09/18  07:14    


 


Est GFR ( Amer)  44   02/09/18  07:14    


 


Est GFR (Non-Af Amer)  36   02/09/18  07:14    


 


POC Glucose (mg/dL)  166 mg/dL ()  H  02/09/18  11:39    


 


Random Glucose  109 mg/dL ()  H  02/09/18  07:14    


 


Calcium  9.1 mg/dl (8.6-10.4)   02/09/18  07:14    


 


Total Bilirubin  0.5 mg/dL (0.2-1.3)   02/09/18  07:14    


 


AST  37 U/L (14-36)  H  02/09/18  07:14    


 


ALT  40 U/L (9-52)   02/09/18  07:14    


 


Alkaline Phosphatase  52 U/L ()   02/09/18  07:14    


 


Total Creatine Kinase  116 U/L ()   02/06/18  11:35    


 


CK-MB (Mass)  1.76 ng/mL (0.0-3.38)   02/06/18  11:35    


 


Troponin I  0.0420 ng/mL (0.00-0.120)   02/06/18  11:35    


 


Total Protein  7.0 g/dL (6.3-8.3)   02/09/18  07:14    


 


Albumin  3.5 g/dL (3.5-5.0)   02/09/18  07:14    


 


Globulin  3.5 gm/dL (2.2-3.9)   02/09/18  07:14    


 


Albumin/Globulin Ratio  1.0  (1.0-2.1)   02/09/18  07:14    


 


Stool Occult Blood  Negative  (NEGATIVE)   02/07/18  20:24    


 


Stool Leukocytes, Qual  Negative  (NEGATIVE)   02/06/18  20:35    


 


C. difficile Ag & Toxin  Negative  (NEGATIVE)   02/06/18  20:35    














- Hospital Course


Hospital Course: 





ADMITTED FOR DIZZINESS AND FREQUENT FALLING 


HAD SIMILAR COMPLAINTS LAST MONTH AND WAS HOSPITALIZED IN  . DURING THE LAST 

VISIT , PT HAD GHAZAL APICAL INFILTRATE AND PRELIMINARY W/U FOR TB WAS NEG 


PT WENT HOME ON LEVAQUIN 


FAMILY BROUGHT THE PT BACK TO HOSPITAL WITH WEAKNESS AND DIZZINESS AND FALLS 


PT LAST ADMISSION DECLINED SHORT TERM REHAB 


LIVES ALONE 





ID CLEARED THE PT FROM INF. POINT OF VIEW 


THERE WERE NO OTHER ACUTE PROBLEMS 


PT WAS D/C TO FAMILY'S CHOICE OF REHAB 





Discharge Exam





- Head Exam


Head Exam: NORMAL INSPECTION





Discharge Plan





- Follow Up Plan


Condition: STABLE


Disposition: HOME/ ROUTINE


Instructions:  Heart Failure (DC), Syncope (DC), Impaired Kidney Function (DC), 

Chronic Lung Disease and Infection Prevention (DC)


Additional Instructions: 


ADMITTING PATIENT UNDER THE FACILITY DOCTOR OR PMD AT [facility] 


FOLLOW UP WITH DR RIBERA 1-2 WEEKS AT HIS OFFICE ---CALL FOR APPOINTMENT


FOLLOW UP WITH DR GAITAN 1-2 WEEKS ----CALL FOR APPOINTMENT


CONTINUE ALL YOUR HOME MEDICATION 


NO NEW PRESCRIPTION 


ACTIVITY AS ORDER PER ADMITTING DOCTOR AND FACILITY PROTOCOL


FOR FURTHER ORDER THE ADMITTING DOCTOR


   


   





Referrals: 


Pa Gaitan MD [Staff Provider] - 


Alex Ribera MD [Staff Provider] -

## 2018-08-17 ENCOUNTER — HOSPITAL ENCOUNTER (INPATIENT)
Dept: HOSPITAL 31 - C.ER | Age: 83
LOS: 8 days | Discharge: NURSING, REHAB OR LONG-TERM CARE | DRG: 65 | End: 2018-08-25
Attending: INTERNAL MEDICINE | Admitting: INTERNAL MEDICINE
Payer: MEDICARE

## 2018-08-17 VITALS — BODY MASS INDEX: 24.8 KG/M2

## 2018-08-17 DIAGNOSIS — G81.94: ICD-10-CM

## 2018-08-17 DIAGNOSIS — I50.9: ICD-10-CM

## 2018-08-17 DIAGNOSIS — I63.9: Primary | ICD-10-CM

## 2018-08-17 DIAGNOSIS — I25.10: ICD-10-CM

## 2018-08-17 DIAGNOSIS — I48.91: ICD-10-CM

## 2018-08-17 DIAGNOSIS — M81.0: ICD-10-CM

## 2018-08-17 DIAGNOSIS — I49.3: ICD-10-CM

## 2018-08-17 DIAGNOSIS — Z87.440: ICD-10-CM

## 2018-08-17 DIAGNOSIS — E03.9: ICD-10-CM

## 2018-08-17 DIAGNOSIS — F03.91: ICD-10-CM

## 2018-08-17 DIAGNOSIS — I48.92: ICD-10-CM

## 2018-08-17 DIAGNOSIS — G51.0: ICD-10-CM

## 2018-08-17 DIAGNOSIS — E11.9: ICD-10-CM

## 2018-08-17 DIAGNOSIS — I11.0: ICD-10-CM

## 2018-08-17 DIAGNOSIS — Z79.01: ICD-10-CM

## 2018-08-17 DIAGNOSIS — J44.9: ICD-10-CM

## 2018-08-17 DIAGNOSIS — E78.00: ICD-10-CM

## 2018-08-17 LAB
ALBUMIN SERPL-MCNC: 4.1 G/DL (ref 3.5–5)
ALBUMIN/GLOB SERPL: 1.1 {RATIO} (ref 1–2.1)
ALT SERPL-CCNC: 47 U/L (ref 9–52)
APTT BLD: 29 SECONDS (ref 21–34)
AST SERPL-CCNC: 73 U/L (ref 14–36)
BASOPHILS # BLD AUTO: 0.1 K/UL (ref 0–0.2)
BASOPHILS NFR BLD: 0.6 % (ref 0–2)
BILIRUB UR-MCNC: NEGATIVE MG/DL
BUN SERPL-MCNC: 29 MG/DL (ref 7–17)
CALCIUM SERPL-MCNC: 9.8 MG/DL (ref 8.6–10.4)
EOSINOPHIL # BLD AUTO: 0 K/UL (ref 0–0.7)
EOSINOPHIL NFR BLD: 0.4 % (ref 0–4)
ERYTHROCYTE [DISTWIDTH] IN BLOOD BY AUTOMATED COUNT: 12.7 % (ref 11.5–14.5)
GFR NON-AFRICAN AMERICAN: 48
GLUCOSE UR STRIP-MCNC: NORMAL MG/DL
HDLC SERPL-MCNC: 52 MG/DL (ref 30–70)
HGB BLD-MCNC: 13.1 G/DL (ref 11–16)
INR PPP: 1.2
LDLC SERPL-MCNC: 68 MG/DL (ref 0–129)
LEUKOCYTE ESTERASE UR-ACNC: (no result) LEU/UL
LYMPHOCYTES # BLD AUTO: 1.9 K/UL (ref 1–4.3)
LYMPHOCYTES NFR BLD AUTO: 16.5 % (ref 20–40)
MCH RBC QN AUTO: 29.8 PG (ref 27–31)
MCHC RBC AUTO-ENTMCNC: 33.5 G/DL (ref 33–37)
MCV RBC AUTO: 89.2 FL (ref 81–99)
MONOCYTES # BLD: 1 K/UL (ref 0–0.8)
MONOCYTES NFR BLD: 8.6 % (ref 0–10)
NEUTROPHILS # BLD: 8.3 K/UL (ref 1.8–7)
NEUTROPHILS NFR BLD AUTO: 73.9 % (ref 50–75)
NRBC BLD AUTO-RTO: 0 % (ref 0–2)
PH UR STRIP: 5 [PH] (ref 5–8)
PLATELET # BLD: 234 K/UL (ref 130–400)
PMV BLD AUTO: 9 FL (ref 7.2–11.7)
PROT UR STRIP-MCNC: NEGATIVE MG/DL
PROTHROMBIN TIME: 12.9 SECONDS (ref 9.7–12.2)
RBC # BLD AUTO: 4.38 MIL/UL (ref 3.8–5.2)
RBC # UR STRIP: NEGATIVE /UL
SP GR UR STRIP: 1.03 (ref 1–1.03)
SQUAMOUS EPITHIAL: 7 /HPF (ref 0–5)
TROPONIN I SERPL-MCNC: 0.07 NG/ML (ref 0–0.12)
UROBILINOGEN UR-MCNC: NORMAL MG/DL (ref 0.2–1)
WBC # BLD AUTO: 11.3 K/UL (ref 4.8–10.8)

## 2018-08-17 RX ADMIN — HUMAN INSULIN SCH: 100 INJECTION, SOLUTION SUBCUTANEOUS at 22:54

## 2018-08-17 NOTE — CT
Date of service: 



08/17/2018



PROCEDURE:  CT Angiography of the neck and Brain.



HISTORY:

code stroke



COMPARISON:

None available.



TECHNIQUE:

CT angiography of the intracranial arteries was performed. Coronal 

and sagittal maximum intensity projection reformated images were 

generated.



This CT exam was performed using one or more of the following dose 

reduction techniques: Automated exposure control, adjustment of the 

mA and/or kV according to patient size, and/or use of iterative 

reconstruction technique.



FINDINGS:

Patchy ground-glass opacities noted in the lungs. 



RIGHT CAROTID ARTERIES:

Common Carotid Artery: Normal.



Carotid Bifurcation: Atherosclerotic calcifications seen at the 

carotid bifurcation without evidence of significant stenosis.



Internal Carotid Artery:Normal.



External Carotid Artery (proximal branches): Normal.



LEFT CAROTID ARTERIES:

Common Carotid Artery: Normal.



Carotid Bifurcation: Moderate atherosclerotic calcification and mural 

thickening seen at the carotid bifurcation



Internal Carotid Artery:Mild approximately 50 percent stenosis seen 

at the origin and proximal left internal carotid artery



External Carotid Artery (proximal branches): Normal.



VERTEBRAL ARTERIES:

Right Vertebral Artery: Normal.



Left Vertebral Artery: Normal.



INTERNAL CEREBRAL ARTERIES:

Unremarkable. The skull base, petrous, cavernous and supraclinoid 

segments are bilaterally widely patent. 



ANTERIOR CEREBRAL ARTERIES:

Unremarkable. A1 and A2 segments are widely patent. Smaller distal 

branches unremarkable, as visualized.



MIDDLE CEREBRAL ARTERIES:

Unremarkable. M1 and M2 segments are widely patent. There is paucity 

of the M3 branches on the right comparing to the left at the temporal 

frontal parietal region. Perisylvian branches grossly symmetric.



POSTERIOR CIRCULATION:

Basilar Artery: Unremarkable.



Distal Vertebral Arteries: Unremarkable.



Posterior Cerebral Arteries: Unremarkable.



Posterior Inferior Cerebellar Arteries: Unremarkable.



ANEURYSM/ VASCULAR MALFORMATIONS:

None.



OTHER FINDINGS:

None. 



IMPRESSION:

Atherosclerotic disease.



50 percent stenosis at the origin and proximal left internal carotid 

artery.



Paucity and decrease in the number of the M3 branches on the right 

compared to the left. Please correlate clinically.



No evidence of M1 occlusion or critical stenosis.

## 2018-08-17 NOTE — CP.PCM.HP
History of Present Illness





- History of Present Illness


History of Present Illness: 





COMPREHENSIVE   HISTORY & PHYSICAL EXAM 


Patient admitted from [facility] emergency room with left-sided upper 

extremity weakness drooping of the left side of the face and palpitation.


   


HPI


More than 48 hours ago patient complained of weakness of the left upper 

extremity and drooping of the drooping of the left side of the face.  Patient 

was also found to be in atrial fibrillation with rapid ventricular rate 

requiring IV Cardizem.  Patient atrial fibrillation is new onset.  Patient has 

history of diabetes hypertension and previous history of UTIs and COPD.  

Patient was evaluated by the neurology and was put on aspirin and Plavix.  CAT 

scan of the head did not show any acute infarct or bleed





PAST HIST.


PERSONAL HIST:   Smoking.   N   Alcohol.   N      Allergy N            Travel_-

      .


FAMILY HIST : 


ROS :


Constitutional: Negative for weight change, chills, night sweats, fatigue and 

usage of assist device. 


  Eyes: Negative for redness, swelling, itching, discharge, vision changes, 

blurry vision, double vision, glaucoma, cataracts, 


  Ears: Negative for hearing loss, ringing, , tinnitus, vertigo


 Nose: Negative for rhinorrhea, stuffiness, sniffing, itching, postnasal drip, 

discoloration, nasal congestion and epistaxis. 


 Throat: Negative for throat clearing, sore throat, hoarseness, difficulty 

swallowing and difficulty speaking. 


  Respiratory: Negative for cough, , sputum production, chest tightness,  

wheezing, pleuritic chest pain ,daytime somnolence, chronic cough, hemoptysis, 

snoring at night,


 Cardiovascular: Negative for chest pain, palpitations, orthopnea, PND, Edema 

of legs, leg cramps, angina, claudication, , irregular heartbeat,


 Neurology weakness of the left upper extremity and drooping of the left face.  


Gastrointestinal: Negative for difficulty swallowing, diarrhea, constipation, 

black stools, rectal bleeding, nausea, flatulence, reflux, poor appetite, 

changes in bowel habits, abdominal pain


  Genitourinary: Negative for frequent urination, hematuria, discharge, 

incontinence, urinary retention, frequent UTI, Psychiatric: Negative for 

depression, anxiety/panic, suicidal tendencies, 


Musculoskeletal: Negative for swollen joints, back pain, , neck pain, morning 

stiffness of joints, . 


 Skin: Negative for rash, ulcers, itching, dry skin and pigmented lesions.


P/E: 


  Constitutional: Appears stated age and in no apparent distress. 


 Head: Normocephalic. 


  Ears: External ear canals patent without inflammation. Tympanic membranes 

intact with normal light reflex and landmark. 


  Eyes: Pupils are central, bilaterally equal, symmetrical and reacts to light 

with normal movements and no icterus or pallor. 


 Nose: External nares are patent. Mucosa is pink  Mouth-Throat: Good general 

appearance and condition. No post-pharyngeal/oropharyngeal erythema and 

tonsillar hypertrophy. Good dental hygiene. 


  Neck-Lymphatic: Neck is supple with normal ROM, no thyromegaly, lymph nodes 

or masses. JVD is normal with no carotid bruit. 


  Lungs: Clear to percussion and auscultation with bilateral normal air entry. 


  Cardiovascular: S1 and S2 are normal with no murmurs, gallops and rub. 


  GI Exam: No hepatomegaly. Abdomen is soft and non-tender. No Organomegaly , 

masses or hernias are evident and bowel sounds are normal and active. 


  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 

extremity power 4 x 5.  Reflexes are normal plantars downgoing.


  Musculoskeletal: No tender spots with normal curvature of the spine with no 

swelling or restricted ROM of the small and large joints. 


  Extremities: Homans sign absent. Intact pulses with no pitting edema, calf 

tenderness or skin color changes. 


  Skin: No rash, eruptions or abnormal skin pigmentation





LAB/RADIOLOGY:


ASSESMENT :


New onset of acute CVA.


Atrial fibrillation with rapid ventricular rate


Type 2 diabetes.








PLAN: 


See orders








Present on Admission





- Present on Admission


Any Indicators Present on Admission: No





Past Patient History





- Past Medical History & Family History


Past Medical History?: Yes





- Past Social History


Smoking Status: Never Smoked





- CARDIAC


Hx Atrial Fibrillation: No


Hx Congestive Heart Failure: Yes


Hx Hypercholesterolemia: Yes


Hx Hypertension: Yes


Hx Mitral Valve Prolapse: No


Hx Pacemaker: No


Hx Peripheral Edema: Yes





- PULMONARY


Hx Asthma: Yes


Hx Bronchitis: Yes


Hx Chronic Obstructive Pulmonary Disease (COPD): Yes





- NEUROLOGICAL


Hx Neurological Disorder: Yes


Hx Dizziness: Yes





- HEENT


Hx HEENT Problems: Yes


Hx Cataracts: Yes (cataract surgery 3 years ago?)


Hx Glaucoma: No


Other/Comment: wear eyeglasses





- RENAL


Hx Chronic Kidney Disease: No





- ENDOCRINE/METABOLIC


Hx Hypothyroidism: Yes





- HEMATOLOGICAL/ONCOLOGICAL


Hx Blood Disorders: No





- INTEGUMENTARY


Hx Dermatological Problems: No





- MUSCULOSKELETAL/RHEUMATOLOGICAL


Hx Arthritis: Yes


Hx Osteoporosis: Yes





- GASTROINTESTINAL


Hx Gastrointestinal Disorders: No





- GENITOURINARY/GYNECOLOGICAL


Hx Genitourinary Disorders: No





- PSYCHIATRIC


Hx Substance Use: No





- SURGICAL HISTORY


Hx Surgeries: Yes


Hx Orthopedic Surgery: Yes (left arm surgery 8 yrs ago)





- ANESTHESIA


Hx Anesthesia: Yes


Hx Anesthesia Reactions: No


Hx Malignant Hyperthermia: No





Meds


Allergies/Adverse Reactions: 


 Allergies











Allergy/AdvReac Type Severity Reaction Status Date / Time


 


No Known Allergies Allergy   Verified 02/05/18 21:47














Results





- Vital Signs


Recent Vital Signs: 





 Last Vital Signs











Temp  98.2 F   08/17/18 19:01


 


Pulse  125 H  08/17/18 19:49


 


Resp  23   08/17/18 19:49


 


BP  171/84 H  08/17/18 19:49


 


Pulse Ox  99   08/17/18 19:49














- Labs


Result Diagrams: 


 08/17/18 16:04





 08/17/18 16:04


Labs: 





 Laboratory Results - last 24 hr











  08/17/18 08/17/18 08/17/18





  15:56 16:04 16:04


 


WBC   11.3 H 


 


RBC   4.38 


 


Hgb   13.1 


 


Hct   39.0 


 


MCV   89.2 


 


MCH   29.8 


 


MCHC   33.5 


 


RDW   12.7 


 


Plt Count   234 


 


MPV   9.0 


 


Neut % (Auto)   73.9 


 


Lymph % (Auto)   16.5 L 


 


Mono % (Auto)   8.6 


 


Eos % (Auto)   0.4 


 


Baso % (Auto)   0.6 


 


Neut # (Auto)   8.3 H 


 


Lymph # (Auto)   1.9 


 


Mono # (Auto)   1.0 H 


 


Eos # (Auto)   0.0 


 


Baso # (Auto)   0.1 


 


PT    12.9 H


 


INR    1.2


 


APTT    29


 


Sodium   


 


Potassium   


 


Chloride   


 


Carbon Dioxide   


 


Anion Gap   


 


BUN   


 


Creatinine   


 


Est GFR ( Amer)   


 


Est GFR (Non-Af Amer)   


 


POC Glucose (mg/dL)  144 H  


 


Random Glucose   


 


Hemoglobin A1c   


 


Calcium   


 


Total Bilirubin   


 


AST   


 


ALT   


 


Alkaline Phosphatase   


 


Troponin I   


 


Total Protein   


 


Albumin   


 


Globulin   


 


Albumin/Globulin Ratio   


 


Triglycerides   


 


Cholesterol   


 


LDL Cholesterol Direct   


 


HDL Cholesterol   














  08/17/18 08/17/18





  16:04 16:04


 


WBC  


 


RBC  


 


Hgb  


 


Hct  


 


MCV  


 


MCH  


 


MCHC  


 


RDW  


 


Plt Count  


 


MPV  


 


Neut % (Auto)  


 


Lymph % (Auto)  


 


Mono % (Auto)  


 


Eos % (Auto)  


 


Baso % (Auto)  


 


Neut # (Auto)  


 


Lymph # (Auto)  


 


Mono # (Auto)  


 


Eos # (Auto)  


 


Baso # (Auto)  


 


PT  


 


INR  


 


APTT  


 


Sodium  142 


 


Potassium  5.3 H 


 


Chloride  111 H 


 


Carbon Dioxide  20 L 


 


Anion Gap  16 


 


BUN  29 H 


 


Creatinine  1.1 


 


Est GFR ( Amer)  58 


 


Est GFR (Non-Af Amer)  48 


 


POC Glucose (mg/dL)  


 


Random Glucose  144 H 


 


Hemoglobin A1c   6.8 H


 


Calcium  9.8 


 


Total Bilirubin  0.8 


 


AST  73 H D 


 


ALT  47 


 


Alkaline Phosphatase  74 


 


Troponin I  0.0740 


 


Total Protein  7.7 


 


Albumin  4.1 


 


Globulin  3.7 


 


Albumin/Globulin Ratio  1.1 


 


Triglycerides  90 


 


Cholesterol  152 


 


LDL Cholesterol Direct  68 


 


HDL Cholesterol  52

## 2018-08-17 NOTE — CT
Date of service: 



08/17/2018



PROCEDURE:  CT HEAD WITHOUT CONTRAST.



HISTORY:

Code Stroke



COMPARISON:

Comparison made with prior CT scan and MRI brain 02/05/2018 01/23/2018 respectively. .



TECHNIQUE:

Axial computed tomography images were obtained through the head/brain 

without intravenous contrast.  



Radiation dose:



Total exam DLP = 828.03 mGy-cm.



This CT exam was performed using one or more of the following dose 

reduction techniques: Automated exposure control, adjustment of the 

mA and/or kV according to patient size, and/or use of iterative 

reconstruction technique.



FINDINGS:



HEMORRHAGE:

No intracranial hemorrhage. 



BRAIN:

Re- demonstrated are moderate to fairly significant diffuse/confluent 

chronic periventricular white matter ischemic changes that extend 

peripherally into the deep and subcortical white matter both cerebral 

hemispheres there is also some extension of these changes into the 

white matter tracts of both basal nuclei. Multiple chronic appearing 

chronic bilateral basal nuclei and brain stem lacunar type infarcts 

less well seen compared to prior MRI due to differences in modality 

resolution. Note that the possibility of a small hyperacute infarct 

cannot be excluded on this exam 



Moderate significant central volume loss evidenced by 

disproportionate enlargement of the ventricles compared sulci. 



No obvious parenchymal nor extra-axial mass or collection seen on 

this noncontrast study 



VENTRICLES:

No obstructive hydrocephalus. 



CALVARIUM:

Unremarkable.



PARANASAL SINUSES:

Unremarkable as visualized. No significant inflammatory changes.



MASTOID AIR CELLS:

Unremarkable as visualized. No inflammatory changes.



OTHER FINDINGS:

Changes of bilateral cataract surgery present. 



IMPRESSION:

No evidence of acute intracranial hemorrhage.  Re- demonstrated are 

moderate to fairly significant chronic white matter ischemic changes. 

 Chronic bilateral basal nuclei and brainstem ischemic changes less 

well seen compared to prior high-resolution MRI. Note that the 

possibility of a small hyperacute infarct cannot be excluded on this 

exam.  Clinical correlation recommended. . 



Note that findings discussed with Dr. Heart at approximately 4:21 p.m. 

with written down and read back verification radiology

## 2018-08-17 NOTE — C.PDOC
History Of Present Illness


82 year old female brought to ED via EMS for evaluation of left hemiparesis, 

and facial droop for the past 17 hours. Pt states her left arm weakness started 

at 11PM last night, which is the last time known well. Patient's son called her 

home this morning, but call was not answered. Homemaker arrived this afternoon, 

who found patient still in bed with current symptoms. She denies pain, fever, 

chest pain, shortness of breath, nausea, vomiting, or trauma. As per EMS, pt 

was found to be in rapid Afib with heart rate in the 180s. Cardizem was 

administered. Code stroke was activated upon arrival to ED.





Time Seen by Provider: 08/17/18 15:56


History Per: Patient, EMS


History/Exam Limitations: no limitations





Past Medical History


Reviewed: Historical Data, Nursing Documentation, Vital Signs


Vital Signs: 


 Last Vital Signs











Temp      


 


Pulse  123 H  08/17/18 18:01


 


Resp  23   08/17/18 18:01


 


BP  145/80   08/17/18 18:01


 


Pulse Ox  97   08/17/18 18:01














- Medical History


PMH: Arthritis, Asthma, Bronchitis, CAD, CHF, COPD, Diabetes, HTN, 

Hypercholesterolemia, Hypothyroidism, Osteoporosis, Peripheral Edema


   Denies: Atrial Fibrillation, Mitral Valve Prolapse, Chronic Kidney Disease


Surgical History: 


   Denies: Pacemaker





- [facility] Procedures








ASSISTANCE WITH RESPIRATORY VENTILATION, <24 HRS, CPAP (04/28/16)


INSERTION OF ENDOTRACHEAL AIRWAY INTO TRACHEA, VIA OPENING (04/28/16)


RESPIRATORY VENTILATION, GREATER THAN 96 CONSECUTIVE HOURS (04/28/16)








Family History: States: Unknown Family Hx





- Social History


Hx Tobacco Use: No


Hx Alcohol Use: No


Hx Substance Use: No





- Immunization History


Hx Tetanus Toxoid Vaccination: No


Hx Influenza Vaccination: Yes


Hx Pneumococcal Vaccination: No





Review Of Systems


Except As Marked, All Systems Reviewed And Found Negative.


Constitutional: Negative for: Fever, Chills


Cardiovascular: Negative for: Chest Pain


Respiratory: Negative for: Shortness of Breath


Neurological: Positive for: Other (left hemiparesis and facial droop)





Physical Exam





- Physical Exam


Additional Physical Exam Comments: 





Constitutional: No acute distress.


Head: Normocephalic. Atraumatic.


Eyes: PERRL. Preferential right gaze palsy. 


ENT: Moist mucous membranes.


Neck: Supple.


Cardiovascular: Tachycardic. Radial pulse 2+ bilaterally.


Chest: No tenderness.


Respiratory: Clear to auscultation bilaterally.


GI: Soft. Nontender. Nondistended.


Back: No CVA tenderness.


Musculoskeletal: No tenderness or swelling of extremities.


Skin: No rash.


Neurologic: Alert. Motor 4/5 left arm and leg. 5/5 right arm and leg. Left 

facial droop. Slurred speech. 








ED Course And Treatment





- Laboratory Results


Result Diagrams: 


 08/17/18 16:04





 08/17/18 16:04





Critical Care Time





- Critical Care Note


Total Time (in mins): 45


Documented critical care: time excludes all time spent performing seperately 

billable procedures.





NIHSS Stroke Scale





- Date/Time Evaluation Performed


Date Performed: 08/17/18


When Was NIHSS Performed: Baseline





- How Severe is the Stroke


Level of Consciousness: 0=Alert


LOC to Questions: 0=Both comments correct


LOC to commands: 0=Obeys both correctly


Best Gaze: 1=Partial gaze palsy


Visual: 0=No visual loss


Facial: 2=Partial (lower face paralysis)


Motor Arm - Left: 1=Drift noted before 10 sec


Motor Arm - Right: 0=No drift


Motor Leg - Left: 1=Drift before 5 sec


Motor Leg - Right: 0=No drift


Limb Ataxia: 0=Absent


Sensory: 0=Normal


Best Language: 0=No aphasia


Dysarthia: 1=Mild to moderate slurring


Extinction & Inattention (Neglect): 0=Normal, no object


Score: 6





rTPA Inclusion/Exclusion





- Refusal of Treatment


Patient Refused Treatment: No





- Inclusion Criteria for Altepase


Patient is 18 years or Older: Yes


The Clinical Diagnosis of Ischemic Stroke That is Causing a Potentially 

Disabling Neurological Deficit: Yes


Time of Onset is Well Established to be Less Than 270 Minute Before Treatment 

Would Begin: No


Risk/Benefit Discussed With Patient/Family Member Present: Yes





Medical Decision Making


Medical Decision Making: 


EKG: Atrial flutter with 100bpm. No ST elevation. PVCs.





Head CT


IMPRESSION:


No evidence of acute intracranial hemorrhage.  Re- demonstrated are moderate to 

fairly significant chronic white matter ischemic changes.  Chronic bilateral 

basal nuclei and brainstem ischemic changes less well seen compared to prior 

high-resolution MRI. Note that the possibility of a small hyperacute infarct 

cannot be excluded on this exam.  Clinical correlation recommended. . 





CXR no acute disease.





Dr. Rosa recommends Aspirin and Plavis and will review CT angio. Dr. Ribera 

accepts patient to his service.





Disposition





- Disposition


Disposition: HOSPITALIZED


Disposition Time: 18:00


Condition: GUARDED





- POA


Core Measure Indicators: Code Stroke





- Clinical Impression


Clinical Impression: 


 CVA (cerebral vascular accident), Facial palsy, Hemiparesis








- Scribe Statement


The provider has reviewed the documentation as recorded by the Scribe





KP





All medical record entries made by the Scribe were at my direction and 

personally dictated by me. I have reviewed the chart and agree that the record 

accurately reflects my personal performance of the history, physical exam, 

medical decision making, and the department course for this patient. I have 

also personally directed, reviewed, and agree with the discharge instructions 

and disposition.

## 2018-08-17 NOTE — RAD
Date of service: 



08/17/2018



HISTORY:

Code Stroke  



COMPARISON:

02/05/2018. 



FINDINGS:



LUNGS:

No active pulmonary disease.



PLEURA:

No significant pleural effusion identified, no pneumothorax apparent.



CARDIOVASCULAR:

Cardiomegaly.  No evidence of acute, significant cardiovascular 

disease. 



OSSEOUS STRUCTURES:

No significant abnormalities.



VISUALIZED UPPER ABDOMEN:

Normal.



OTHER FINDINGS:

None.



IMPRESSION:

No active disease. No significant interval change compared to the 

prior examination(s).

## 2018-08-18 RX ADMIN — HUMAN INSULIN SCH: 100 INJECTION, SOLUTION SUBCUTANEOUS at 08:03

## 2018-08-18 RX ADMIN — PANTOPRAZOLE SODIUM SCH: 40 TABLET, DELAYED RELEASE ORAL at 13:33

## 2018-08-18 RX ADMIN — CILOSTAZOL SCH MG: 50 TABLET ORAL at 18:09

## 2018-08-18 RX ADMIN — CILOSTAZOL SCH: 50 TABLET ORAL at 13:33

## 2018-08-18 RX ADMIN — HUMAN INSULIN SCH: 100 INJECTION, SOLUTION SUBCUTANEOUS at 17:09

## 2018-08-18 RX ADMIN — HUMAN INSULIN SCH: 100 INJECTION, SOLUTION SUBCUTANEOUS at 21:11

## 2018-08-18 RX ADMIN — CALCIUM CARBONATE-VITAMIN D TAB 500 MG-200 UNIT SCH TAB: 500-200 TAB at 18:09

## 2018-08-18 RX ADMIN — GLIPIZIDE SCH: 5 TABLET, EXTENDED RELEASE ORAL at 13:31

## 2018-08-18 RX ADMIN — CALCIUM CARBONATE-VITAMIN D TAB 500 MG-200 UNIT SCH: 500-200 TAB at 13:33

## 2018-08-18 RX ADMIN — Medication SCH: at 13:31

## 2018-08-18 RX ADMIN — HUMAN INSULIN SCH: 100 INJECTION, SOLUTION SUBCUTANEOUS at 13:32

## 2018-08-18 NOTE — CP.PCM.PN
Subjective





- Date & Time of Evaluation


Date of Evaluation: 08/18/18


Time of Evaluation: 14:35





- Subjective


Subjective: 





CHIEF COMPLAINTS TODAY :


Patient passed a swallowing test diet order.


Weakness of the left upper extremity improving no speech defect.





ROS.


HEENT :  N.


Resp :       No cough, wheezing ,pleuritic CP ,or hemoptysis 


Cardio :     No anginal  CP, PND, orthopnea, palpitation 


GI :           No abd.pain, n/v ,diarrhea or GI bleeding .


CNS : No headache, vertigo, focal deficit.


Musculoskel :  No joint swelling ,


Derm :        No rash 


Psych :     Normal affect.


Ext :  No  swelling ,calf pain 





PE.


Pt. is alert awake in no distress.


V.S  As noted in the chart 


Head ,ear nose,throat and eyes : Normal.


Neck : Supple with normal carotids.


Lungs: Clear air entry.


Heart : S1 & S2 normal with S4. No murmur.


Abd : Soft non tender with normal bowel sounds.


Neuro : Moves all ext. with mild left upper extremity weakness.


Ext : No edema with intact pulses.Non tender calves 


Derm : No rashes or decubitus ulcer.





LABS/RADIOLOGY:





ASSESSMENT/PLAN : 


Evolving left CVA.


Atrial fibrillation with moderate to rapid ventricular rate


Currently patient is on aspirin and Plavix patient will need anticoagulation 

because of her atrial fibrillation











Objective





- Vital Signs/Intake and Output


Vital Signs (last 24 hours): 


 











Temp Pulse Resp BP Pulse Ox


 


 97.4 F L  88   18   115/76   98 


 


 08/18/18 07:00  08/18/18 07:00  08/18/18 07:00  08/18/18 07:00  08/18/18 04:37








Intake and Output: 


 











 08/18/18 08/18/18





 11:59 23:59


 


Intake Total 205 


 


Output Total 600 


 


Balance -395 














- Medications


Medications: 


 Current Medications





Aspirin (Aspirin Chewable)  81 mg PO DAILY [facility]


   Last Admin: 08/18/18 13:31 Dose:  Not Given


Calcium/Vitamin D (Oyster Shell Calcium/Vitamin D 500 Mg-200 Iu)  1 tab PO BID 

[facility]


   Last Admin: 08/18/18 13:33 Dose:  Not Given


Cilostazol (Pletal)  50 mg PO BID [facility]


   Last Admin: 08/18/18 13:33 Dose:  Not Given


Ferrous Sulfate (Feosol)  325 mg PO DAILY [facility]


   Last Admin: 08/18/18 13:31 Dose:  Not Given


Gabapentin (Neurontin)  300 mg PO DAILY [facility]


   Last Admin: 08/18/18 13:32 Dose:  Not Given


Glipizide (Glucotrol Xl)  5 mg PO DAILY [facility]


   Last Admin: 08/18/18 13:31 Dose:  Not Given


Heparin Sodium (Porcine) (Heparin)  5,000 units SC Q12 [facility]


Home Med (Sitagliptin Phos/Metformin Hcl [Janumet 50-1,000 Mg Tablet])  1 each 

PO BID [facility]


   Last Admin: 08/18/18 13:33 Dose:  Not Given


Home Med (Azelastine Hcl [Azelastine Hcl])  0.05 % OU BID [facility]


Hydrochlorothiazide (Hydrodiuril)  25 mg PO DAILY [facility]


   Last Admin: 08/18/18 13:32 Dose:  Not Given


Diltiazem HCl 125 mg/ Sodium (Chloride)  125 mls @ 10 mls/hr IV .[id number] HUBER; 

10 MG/HR


   PRN Reason: Protocol


   Last Admin: 08/18/18 09:31 Dose:  10 mg/hr, 10 mls/hr


Insulin Human Regular (Novolin R)  0 unit SC ACHS [facility]


   PRN Reason: Protocol


   Last Admin: 08/18/18 13:32 Dose:  Not Given


Losartan Potassium (Cozaar)  100 mg PO DAILY [facility]


   Last Admin: 08/18/18 13:31 Dose:  Not Given


Multivitamins (Hexavitamin)  1 tab PO DAILY [facility]


   Last Admin: 08/18/18 13:31 Dose:  Not Given


Pantoprazole Sodium (Protonix Ec Tab)  40 mg PO DAILY [facility]


   Last Admin: 08/18/18 13:33 Dose:  Not Given


Rosuvastatin Calcium (Crestor)  5 mg PO HS [facility]


   Last Admin: 08/17/18 22:54 Dose:  Not Given











- Labs


Labs: 


 





 08/17/18 16:04 





 08/17/18 16:04 





 











PT  12.9 SECONDS (9.7-12.2)  H  08/17/18  16:04    


 


INR  1.2   08/17/18  16:04    


 


APTT  29 SECONDS (21-34)   08/17/18  16:04

## 2018-08-19 LAB
ALBUMIN SERPL-MCNC: 3.6 G/DL (ref 3.5–5)
ALBUMIN/GLOB SERPL: 1.1 {RATIO} (ref 1–2.1)
ALT SERPL-CCNC: 36 U/L (ref 9–52)
AST SERPL-CCNC: 45 U/L (ref 14–36)
BASOPHILS # BLD AUTO: 0 K/UL (ref 0–0.2)
BASOPHILS NFR BLD: 0.2 % (ref 0–2)
BUN SERPL-MCNC: 18 MG/DL (ref 7–17)
CALCIUM SERPL-MCNC: 9.3 MG/DL (ref 8.6–10.4)
EOSINOPHIL # BLD AUTO: 0.2 K/UL (ref 0–0.7)
EOSINOPHIL NFR BLD: 2.1 % (ref 0–4)
ERYTHROCYTE [DISTWIDTH] IN BLOOD BY AUTOMATED COUNT: 12.4 % (ref 11.5–14.5)
GFR NON-AFRICAN AMERICAN: > 60
HGB BLD-MCNC: 11.8 G/DL (ref 11–16)
LYMPHOCYTES # BLD AUTO: 1.2 K/UL (ref 1–4.3)
LYMPHOCYTES NFR BLD AUTO: 10.4 % (ref 20–40)
MCH RBC QN AUTO: 30.6 PG (ref 27–31)
MCHC RBC AUTO-ENTMCNC: 34.5 G/DL (ref 33–37)
MCV RBC AUTO: 88.7 FL (ref 81–99)
MONOCYTES # BLD: 0.7 K/UL (ref 0–0.8)
MONOCYTES NFR BLD: 6.4 % (ref 0–10)
NEUTROPHILS # BLD: 9.3 K/UL (ref 1.8–7)
NEUTROPHILS NFR BLD AUTO: 80.9 % (ref 50–75)
NRBC BLD AUTO-RTO: 0 % (ref 0–2)
PLATELET # BLD: 218 K/UL (ref 130–400)
PMV BLD AUTO: 9.4 FL (ref 7.2–11.7)
RBC # BLD AUTO: 3.86 MIL/UL (ref 3.8–5.2)
WBC # BLD AUTO: 11.5 K/UL (ref 4.8–10.8)

## 2018-08-19 RX ADMIN — HUMAN INSULIN SCH UNITS: 100 INJECTION, SOLUTION SUBCUTANEOUS at 12:04

## 2018-08-19 RX ADMIN — HUMAN INSULIN SCH: 100 INJECTION, SOLUTION SUBCUTANEOUS at 22:41

## 2018-08-19 RX ADMIN — CALCIUM CARBONATE-VITAMIN D TAB 500 MG-200 UNIT SCH TAB: 500-200 TAB at 17:41

## 2018-08-19 RX ADMIN — Medication SCH TAB: at 09:32

## 2018-08-19 RX ADMIN — PANTOPRAZOLE SODIUM SCH MG: 40 TABLET, DELAYED RELEASE ORAL at 09:32

## 2018-08-19 RX ADMIN — GLIPIZIDE SCH MG: 5 TABLET, EXTENDED RELEASE ORAL at 09:33

## 2018-08-19 RX ADMIN — CILOSTAZOL SCH MG: 50 TABLET ORAL at 17:41

## 2018-08-19 RX ADMIN — CALCIUM CARBONATE-VITAMIN D TAB 500 MG-200 UNIT SCH TAB: 500-200 TAB at 09:33

## 2018-08-19 RX ADMIN — HUMAN INSULIN SCH UNITS: 100 INJECTION, SOLUTION SUBCUTANEOUS at 17:41

## 2018-08-19 RX ADMIN — CILOSTAZOL SCH MG: 50 TABLET ORAL at 09:33

## 2018-08-19 RX ADMIN — HUMAN INSULIN SCH UNITS: 100 INJECTION, SOLUTION SUBCUTANEOUS at 08:01

## 2018-08-19 NOTE — CP.PCM.PN
Subjective





- Date & Time of Evaluation


Date of Evaluation: 08/19/18


Time of Evaluation: 16:03





- Subjective


Subjective: 





CHIEF COMPLAINTS TODAY :


Patient passed a swallowing test diet order.


Weakness of the left upper extremity improving no speech defect.





ROS.


HEENT :  N.


Resp :       No cough, wheezing ,pleuritic CP ,or hemoptysis 


Cardio :     No anginal  CP, PND, orthopnea, palpitation 


GI :           No abd.pain, n/v ,diarrhea or GI bleeding .


CNS : No headache, vertigo, focal deficit.


Musculoskel :  No joint swelling ,


Derm :        No rash 


Psych :     Normal affect.


Ext :  No  swelling ,calf pain 





PE.


Pt. is alert awake in no distress.


V.S  As noted in the chart 


Head ,ear nose,throat and eyes : Normal.


Neck : Supple with normal carotids.


Lungs: Clear air entry.


Heart : S1 & S2 normal with S4. No murmur.


Abd : Soft non tender with normal bowel sounds.


Neuro : Moves all ext. with mild left upper extremity weakness.


Ext : No edema with intact pulses.Non tender calves 


Derm : No rashes or decubitus ulcer.





LABS/RADIOLOGY:





ASSESSMENT/PLAN : 


Evolving left CVA.


Atrial fibrillation with moderate to rapid ventricular rate


Currently patient is on aspirin and Plavix patient will need anticoagulation 

because of her atrial fibrillation





Objective





- Vital Signs/Intake and Output


Vital Signs (last 24 hours): 


 











Temp Pulse Resp BP Pulse Ox


 


 97.9 F   96 H  20   165/72 H  96 


 


 08/19/18 15:00  08/19/18 15:00  08/19/18 15:00  08/19/18 15:00  08/19/18 15:00








Intake and Output: 


 











 08/19/18 08/19/18





 11:59 23:59


 


Intake Total 125 


 


Balance 125 














- Medications


Medications: 


 Current Medications





Amlodipine Besylate (Norvasc)  10 mg PO DAILY [facility]


Aspirin (Aspirin Chewable)  81 mg PO DAILY [facility]


   Last Admin: 08/19/18 09:33 Dose:  81 mg


Calcium/Vitamin D (Oyster Shell Calcium/Vitamin D 500 Mg-200 Iu)  1 tab PO BID 

[facility]


   Last Admin: 08/19/18 09:33 Dose:  1 tab


Cilostazol (Pletal)  50 mg PO BID [facility]


   Last Admin: 08/19/18 09:33 Dose:  50 mg


Clopidogrel Bisulfate (Plavix)  75 mg PO DAILY [facility]


   Last Admin: 08/19/18 09:33 Dose:  75 mg


Ferrous Sulfate (Feosol)  325 mg PO DAILY [facility]


   Last Admin: 08/19/18 09:51 Dose:  325 mg


Gabapentin (Neurontin)  300 mg PO DAILY [facility]


   Last Admin: 08/19/18 09:32 Dose:  300 mg


Glipizide (Glucotrol Xl)  5 mg PO DAILY [facility]


   Last Admin: 08/19/18 09:33 Dose:  5 mg


Heparin Sodium (Porcine) (Heparin)  5,000 units SC Q12 [facility]


   Last Admin: 08/19/18 09:33 Dose:  5,000 units


Home Med (Azelastine Hcl [Azelastine Hcl])  0.05 % OU BID [facility]


Hydrochlorothiazide (Hydrodiuril)  25 mg PO DAILY [facility]


   Last Admin: 08/19/18 09:33 Dose:  25 mg


Diltiazem HCl 125 mg/ Sodium (Chloride)  125 mls @ 10 mls/hr IV .[id number] [facility]; 

10 MG/HR


   PRN Reason: Protocol


   Last Admin: 08/19/18 10:07 Dose:  10 mg/hr, 10 mls/hr


Insulin Human Regular (Novolin R)  0 unit SC ACHS [facility]


   PRN Reason: Protocol


   Last Admin: 08/19/18 12:04 Dose:  3 units


Metformin HCl (Glucophage)  1,000 mg PO DAILY [facility]


   Last Admin: 08/19/18 09:32 Dose:  1,000 mg


Multivitamins (Hexavitamin)  1 tab PO DAILY [facility]


   Last Admin: 08/19/18 09:32 Dose:  1 tab


Pantoprazole Sodium (Protonix Ec Tab)  40 mg PO DAILY [facility]


   Last Admin: 08/19/18 09:32 Dose:  40 mg


Rosuvastatin Calcium (Crestor)  5 mg PO HS [facility]


   Last Admin: 08/18/18 21:05 Dose:  5 mg


Sitagliptin Phosphate (Januvia)  50 mg PO DAILY [facility]


   Last Admin: 08/19/18 09:33 Dose:  50 mg











- Labs


Labs: 


 





 08/19/18 08:21 





 08/19/18 08:21 





 











PT  12.9 SECONDS (9.7-12.2)  H  08/17/18  16:04    


 


INR  1.2   08/17/18  16:04    


 


APTT  29 SECONDS (21-34)   08/17/18  16:04

## 2018-08-20 LAB
ALBUMIN SERPL-MCNC: 3.8 G/DL (ref 3.5–5)
ALBUMIN/GLOB SERPL: 1.1 {RATIO} (ref 1–2.1)
ALT SERPL-CCNC: 30 U/L (ref 9–52)
AST SERPL-CCNC: 43 U/L (ref 14–36)
BASOPHILS # BLD AUTO: 0 K/UL (ref 0–0.2)
BASOPHILS NFR BLD: 0.2 % (ref 0–2)
BUN SERPL-MCNC: 14 MG/DL (ref 7–17)
CALCIUM SERPL-MCNC: 9.3 MG/DL (ref 8.6–10.4)
EOSINOPHIL # BLD AUTO: 0.3 K/UL (ref 0–0.7)
EOSINOPHIL NFR BLD: 1.8 % (ref 0–4)
ERYTHROCYTE [DISTWIDTH] IN BLOOD BY AUTOMATED COUNT: 12.6 % (ref 11.5–14.5)
GFR NON-AFRICAN AMERICAN: > 60
HGB BLD-MCNC: 12.3 G/DL (ref 11–16)
LYMPHOCYTES # BLD AUTO: 2 K/UL (ref 1–4.3)
LYMPHOCYTES NFR BLD AUTO: 14.1 % (ref 20–40)
MCH RBC QN AUTO: 30 PG (ref 27–31)
MCHC RBC AUTO-ENTMCNC: 33.8 G/DL (ref 33–37)
MCV RBC AUTO: 88.8 FL (ref 81–99)
MONOCYTES # BLD: 1.2 K/UL (ref 0–0.8)
MONOCYTES NFR BLD: 8.2 % (ref 0–10)
NEUTROPHILS # BLD: 11 K/UL (ref 1.8–7)
NEUTROPHILS NFR BLD AUTO: 75.7 % (ref 50–75)
NRBC BLD AUTO-RTO: 0 % (ref 0–2)
PLATELET # BLD: 214 K/UL (ref 130–400)
PMV BLD AUTO: 8.9 FL (ref 7.2–11.7)
RBC # BLD AUTO: 4.1 MIL/UL (ref 3.8–5.2)
WBC # BLD AUTO: 14.6 K/UL (ref 4.8–10.8)

## 2018-08-20 RX ADMIN — HUMAN INSULIN SCH: 100 INJECTION, SOLUTION SUBCUTANEOUS at 07:58

## 2018-08-20 RX ADMIN — HUMAN INSULIN SCH UNITS: 100 INJECTION, SOLUTION SUBCUTANEOUS at 14:01

## 2018-08-20 RX ADMIN — CALCIUM CARBONATE-VITAMIN D TAB 500 MG-200 UNIT SCH TAB: 500-200 TAB at 18:52

## 2018-08-20 RX ADMIN — CILOSTAZOL SCH MG: 50 TABLET ORAL at 10:08

## 2018-08-20 RX ADMIN — GLIPIZIDE SCH MG: 5 TABLET, EXTENDED RELEASE ORAL at 10:07

## 2018-08-20 RX ADMIN — CILOSTAZOL SCH MG: 50 TABLET ORAL at 18:52

## 2018-08-20 RX ADMIN — CALCIUM CARBONATE-VITAMIN D TAB 500 MG-200 UNIT SCH TAB: 500-200 TAB at 10:08

## 2018-08-20 RX ADMIN — Medication SCH TAB: at 10:07

## 2018-08-20 RX ADMIN — HUMAN INSULIN SCH: 100 INJECTION, SOLUTION SUBCUTANEOUS at 22:11

## 2018-08-20 RX ADMIN — HUMAN INSULIN SCH: 100 INJECTION, SOLUTION SUBCUTANEOUS at 16:30

## 2018-08-20 RX ADMIN — PANTOPRAZOLE SODIUM SCH MG: 40 TABLET, DELAYED RELEASE ORAL at 10:12

## 2018-08-20 NOTE — CARD
--------------- APPROVED REPORT --------------





Date of service: 08/20/2018



EXAM: Two-dimensional and M-mode echocardiogram with Doppler and 

color Doppler.



Other Information 

Quality : GoodRhythm : 



INDICATION

CVA/TIA Dizziness and Vertigo Syncope Congestive Heart Failure 



RISK FACTORS

Hypertension 

Hyperlipidemia

Diabetes



2D DIMENSIONS 

IVSd2.0   (0.7-1.1cm)Aortic Root (2D)2.6   (2.0-3.7cm)

LVDd2.0   (3.9-5.9cm)PWd2.3   (0.7-1.1cm)

LVDs1.3   (2.5-4.0cm)FS (%) 32.3   %

LVEF (%)63.7   (>50%)



M-Mode DIMENSIONS 

Left Atrium (MM)3.61   (2.5-4.0cm)Aortic Root2.73   (2.2-3.7cm)

Aortic Cusp Exc.1.71   (1.5-2.0cm)



Mitral Valve

MV E Vbnqvnar870.5cm/sMV A Lkaxbfhf73.5cm/sMV [id number]

E/A ratio2.0MVA (PHT)4.05cm2



TDI

E/Lateral E'0.0E/Medial E'0.0



Tricuspid Valve

TR Peak [id number]/sTR Peak [id number]



<Conclusion>

Left ventricle: thickness: concentric thickeningl; size: normal; 

overall ejection fraction: 65%: 

diastolic filling pressures:elevated



Mitral valve: annulus:MACleaflets: normal: excursion: restricted; 

14mmHg peak trans-mitral gradient: no significant incompetence: left 

atrium: normal; MV area indeterminate

Aortic valve: leaflets: mild calcific thickening: excursion: normal; 

no significant trans-aortic gradient: No significant incompetence: 

aortic root: normal

Right sided Structures: Pulmonary valve: normal; no significant 

incompetence; Tricuspid valve: normal; mild incompetence:

Intra-cardiac hemodynamics: pulmonary systolic pressures: 50mmHg 

central venous pressures: normal

No pericardial effusion

## 2018-08-20 NOTE — CARD
--------------- APPROVED REPORT --------------





Date of service: 08/17/2018



EKG Measurement

Heart [id number]

[id number]

[id number]

[id number]



<Conclusion>

Atrial fibrillation with premature ventricular or aberrantly 

conducted complexes

Left ventricular hypertrophy with repolarization abnormality

Abnormal ECG

## 2018-08-20 NOTE — CP.PCM.PN
Subjective





- Date & Time of Evaluation


Date of Evaluation: 08/20/18


Time of Evaluation: 14:04





- Subjective


Subjective: 





CHIEF COMPLAINTS TODAY :


Patient passed a swallowing test diet order.


Weakness of the left upper extremity improving no speech defect.





ROS.


HEENT :  N.


Resp :       No cough, wheezing ,pleuritic CP ,or hemoptysis 


Cardio :     No anginal  CP, PND, orthopnea, palpitation 


GI :           No abd.pain, n/v ,diarrhea or GI bleeding .


CNS : No headache, vertigo, focal deficit.


Musculoskel :  No joint swelling ,


Derm :        No rash 


Psych :     Normal affect.


Ext :  No  swelling ,calf pain 





PE.


Pt. is alert awake in no distress.


V.S  As noted in the chart 


Head ,ear nose,throat and eyes : Normal.


Neck : Supple with normal carotids.


Lungs: Clear air entry.


Heart : S1 & S2 normal with S4. No murmur.


Abd : Soft non tender with normal bowel sounds.


Neuro : Moves all ext. with mild left upper extremity weakness.


Ext : No edema with intact pulses.Non tender calves 


Derm : No rashes or decubitus ulcer.





LABS/RADIOLOGY:





ASSESSMENT/PLAN : 


Evolving left CVA.


Atrial fibrillation with moderate to rapid ventricular rate


Currently patient is on aspirin and Plavix patient will need anticoagulation 

because of her atrial fibrillation








Objective





- Vital Signs/Intake and Output


Vital Signs (last 24 hours): 


 











Temp Pulse Resp BP Pulse Ox


 


 97.5 F L  123 H  18   121/75   100 


 


 08/20/18 07:20  08/20/18 07:20  08/20/18 07:20  08/20/18 07:20  08/20/18 07:20








Intake and Output: 


 











 08/20/18 08/20/18





 11:59 23:59


 


Intake Total 125 125


 


Balance 125 125














- Medications


Medications: 


 Current Medications





Amlodipine Besylate (Norvasc)  10 mg PO DAILY [facility]


   Last Admin: 08/20/18 10:07 Dose:  10 mg


Aspirin (Aspirin Chewable)  81 mg PO DAILY [facility]


   Last Admin: 08/20/18 10:08 Dose:  81 mg


Calcium/Vitamin D (Oyster Shell Calcium/Vitamin D 500 Mg-200 Iu)  1 tab PO BID 

[facility]


   Last Admin: 08/20/18 10:08 Dose:  1 tab


Cilostazol (Pletal)  50 mg PO BID [facility]


   Last Admin: 08/20/18 10:08 Dose:  50 mg


Clopidogrel Bisulfate (Plavix)  75 mg PO DAILY [facility]


   Last Admin: 08/20/18 10:07 Dose:  75 mg


Ferrous Sulfate (Feosol)  325 mg PO DAILY [facility]


   Last Admin: 08/20/18 10:12 Dose:  325 mg


Gabapentin (Neurontin)  300 mg PO DAILY [facility]


   Last Admin: 08/20/18 10:09 Dose:  300 mg


Glipizide (Glucotrol Xl)  5 mg PO DAILY [facility]


   Last Admin: 08/20/18 10:07 Dose:  5 mg


Heparin Sodium (Porcine) (Heparin)  5,000 units SC Q12 [facility]


   Last Admin: 08/20/18 10:07 Dose:  5,000 units


Home Med (Azelastine Hcl [Azelastine Hcl])  0.05 % OU BID [facility]


Hydrochlorothiazide (Hydrodiuril)  25 mg PO DAILY [facility]


   Last Admin: 08/20/18 10:07 Dose:  25 mg


Diltiazem HCl 125 mg/ Sodium (Chloride)  125 mls @ 10 mls/hr IV .[id number] [facility]; 

10 MG/HR


   PRN Reason: Protocol


   Last Admin: 08/20/18 14:02 Dose:  10 mg/hr, 10 mls/hr


Insulin Human Regular (Novolin R)  0 unit SC ACHS [facility]


   PRN Reason: Protocol


   Last Admin: 08/20/18 14:01 Dose:  2 units


Metformin HCl (Glucophage)  1,000 mg PO DAILY [facility]


   Last Admin: 08/20/18 10:07 Dose:  1,000 mg


Multivitamins (Hexavitamin)  1 tab PO DAILY [facility]


   Last Admin: 08/20/18 10:07 Dose:  1 tab


Pantoprazole Sodium (Protonix Ec Tab)  40 mg PO DAILY [facility]


   Last Admin: 08/20/18 10:12 Dose:  40 mg


Rosuvastatin Calcium (Crestor)  5 mg PO HS [facility]


   Last Admin: 08/19/18 21:52 Dose:  5 mg


Sitagliptin Phosphate (Januvia)  50 mg PO DAILY [facility]


   Last Admin: 08/20/18 10:07 Dose:  50 mg











- Labs


Labs: 


 





 08/20/18 06:17 





 08/20/18 06:17 





 











PT  12.9 SECONDS (9.7-12.2)  H  08/17/18  16:04    


 


INR  1.2   08/17/18  16:04    


 


APTT  29 SECONDS (21-34)   08/17/18  16:04

## 2018-08-20 NOTE — CP.PCM.CON
History of Present Illness





- History of Present Illness


History of Present Illness: 





    82 yr old male with severe dementia who is here for evaluation of 

increasing agitation and behavioral issues.  will not give a history of 

his complaints and is quite non compliant with any tests that are being 

ordered. 


History is obtained from chart. 





Past Patient History





- Past Medical History & Family History


Past Medical History?: Yes





- Past Social History


Smoking Status: Never Smoked





- CARDIAC


Hx Congestive Heart Failure: Yes


Hx Hypercholesterolemia: Yes


Hx Hypertension: Yes





- PULMONARY


Hx Chronic Obstructive Pulmonary Disease (COPD): Yes





- NEUROLOGICAL


Hx Neurological Disorder: Yes


Hx Dizziness: Yes





- HEENT


Hx HEENT Problems: Yes


Hx Cataracts: Yes (cataract surgery 3 years ago?)


Hx Glaucoma: No


Other/Comment: wear eyeglasses





- RENAL


Hx Chronic Kidney Disease: No





- ENDOCRINE/METABOLIC


Hx Diabetes Mellitus Type 2: Yes





- HEMATOLOGICAL/ONCOLOGICAL


Hx Blood Disorders: No





- INTEGUMENTARY


Hx Dermatological Problems: No





- MUSCULOSKELETAL/RHEUMATOLOGICAL


Hx Arthritis: Yes





- GASTROINTESTINAL


Hx Gastrointestinal Disorders: No





- GENITOURINARY/GYNECOLOGICAL


Hx Genitourinary Disorders: No





- PSYCHIATRIC


Hx Substance Use: No





- SURGICAL HISTORY


Hx Surgeries: Yes


Hx Orthopedic Surgery: Yes (left arm surgery 8 yrs ago)





- ANESTHESIA


Hx Anesthesia: Yes


Hx Anesthesia Reactions: No


Hx Malignant Hyperthermia: No





Meds


Allergies/Adverse Reactions: 


 Allergies











Allergy/AdvReac Type Severity Reaction Status Date / Time


 


No Known Allergies Allergy   Verified 02/05/18 21:47














- Medications


Medications: 


 Current Medications





Amlodipine Besylate (Norvasc)  10 mg PO DAILY [facility]


   Last Admin: 08/20/18 10:07 Dose:  10 mg


Aspirin (Aspirin Chewable)  81 mg PO DAILY [facility]


   Last Admin: 08/20/18 10:08 Dose:  81 mg


Calcium/Vitamin D (Oyster Shell Calcium/Vitamin D 500 Mg-200 Iu)  1 tab PO BID 

[facility]


   Last Admin: 08/20/18 10:08 Dose:  1 tab


Cilostazol (Pletal)  50 mg PO BID [facility]


   Last Admin: 08/20/18 10:08 Dose:  50 mg


Clopidogrel Bisulfate (Plavix)  75 mg PO DAILY [facility]


   Last Admin: 08/20/18 10:07 Dose:  75 mg


Ferrous Sulfate (Feosol)  325 mg PO DAILY [facility]


   Last Admin: 08/20/18 10:12 Dose:  325 mg


Gabapentin (Neurontin)  300 mg PO DAILY [facility]


   Last Admin: 08/20/18 10:09 Dose:  300 mg


Glipizide (Glucotrol Xl)  5 mg PO DAILY [facility]


   Last Admin: 08/20/18 10:07 Dose:  5 mg


Heparin Sodium (Porcine) (Heparin)  5,000 units SC Q12 [facility]


   Last Admin: 08/20/18 10:07 Dose:  5,000 units


Home Med (Azelastine Hcl [Azelastine Hcl])  0.05 % OU BID [facility]


Hydrochlorothiazide (Hydrodiuril)  25 mg PO DAILY [facility]


   Last Admin: 08/20/18 10:07 Dose:  25 mg


Diltiazem HCl 125 mg/ Sodium (Chloride)  125 mls @ 10 mls/hr IV .[id number] [facility]; 

10 MG/HR


   PRN Reason: Protocol


   Last Admin: 08/20/18 14:07 Dose:  Not Given


Insulin Human Regular (Novolin R)  0 unit SC ACHS [facility]


   PRN Reason: Protocol


   Last Admin: 08/20/18 16:30 Dose:  Not Given


Metformin HCl (Glucophage)  1,000 mg PO DAILY [facility]


   Last Admin: 08/20/18 10:07 Dose:  1,000 mg


Multivitamins (Hexavitamin)  1 tab PO DAILY [facility]


   Last Admin: 08/20/18 10:07 Dose:  1 tab


Pantoprazole Sodium (Protonix Ec Tab)  40 mg PO DAILY [facility]


   Last Admin: 08/20/18 10:12 Dose:  40 mg


Rosuvastatin Calcium (Crestor)  5 mg PO HS [facility]


   Last Admin: 08/19/18 21:52 Dose:  5 mg


Sitagliptin Phosphate (Januvia)  50 mg PO DAILY [facility]


   Last Admin: 08/20/18 10:07 Dose:  50 mg











Results





- Vital Signs


Recent Vital Signs: 


 Last Vital Signs











Temp  98 F   08/20/18 15:00


 


Pulse  88   08/20/18 15:00


 


Resp  20   08/20/18 15:00


 


BP  117/71   08/20/18 15:00


 


Pulse Ox  97   08/20/18 15:00














- Labs


Result Diagrams: 


 08/20/18 06:17





 08/20/18 06:17


Labs: 


 Laboratory Results - last 24 hr











  08/18/18 08/18/18 08/18/18





  06:14 11:34 16:24


 


WBC   


 


RBC   


 


Hgb   


 


Hct   


 


MCV   


 


MCH   


 


MCHC   


 


RDW   


 


Plt Count   


 


MPV   


 


Neut % (Auto)   


 


Lymph % (Auto)   


 


Mono % (Auto)   


 


Eos % (Auto)   


 


Baso % (Auto)   


 


Neut # (Auto)   


 


Lymph # (Auto)   


 


Mono # (Auto)   


 


Eos # (Auto)   


 


Baso # (Auto)   


 


Sodium   


 


Potassium   


 


Chloride   


 


Carbon Dioxide   


 


Anion Gap   


 


BUN   


 


Creatinine   


 


Est GFR ( Amer)   


 


Est GFR (Non-Af Amer)   


 


POC Glucose (mg/dL)  125 H  198 H  128 H


 


Random Glucose   


 


Calcium   


 


Total Bilirubin   


 


AST   


 


ALT   


 


Alkaline Phosphatase   


 


Total Protein   


 


Albumin   


 


Globulin   


 


Albumin/Globulin Ratio   














  08/18/18 08/19/18 08/19/18





  21:04 07:03 11:03


 


WBC   


 


RBC   


 


Hgb   


 


Hct   


 


MCV   


 


MCH   


 


MCHC   


 


RDW   


 


Plt Count   


 


MPV   


 


Neut % (Auto)   


 


Lymph % (Auto)   


 


Mono % (Auto)   


 


Eos % (Auto)   


 


Baso % (Auto)   


 


Neut # (Auto)   


 


Lymph # (Auto)   


 


Mono # (Auto)   


 


Eos # (Auto)   


 


Baso # (Auto)   


 


Sodium   


 


Potassium   


 


Chloride   


 


Carbon Dioxide   


 


Anion Gap   


 


BUN   


 


Creatinine   


 


Est GFR ( Amer)   


 


Est GFR (Non-Af Amer)   


 


POC Glucose (mg/dL)  182 H  168 H  206 H


 


Random Glucose   


 


Calcium   


 


Total Bilirubin   


 


AST   


 


ALT   


 


Alkaline Phosphatase   


 


Total Protein   


 


Albumin   


 


Globulin   


 


Albumin/Globulin Ratio   














  08/19/18 08/19/18 08/20/18





  17:03 21:13 06:13


 


WBC   


 


RBC   


 


Hgb   


 


Hct   


 


MCV   


 


MCH   


 


MCHC   


 


RDW   


 


Plt Count   


 


MPV   


 


Neut % (Auto)   


 


Lymph % (Auto)   


 


Mono % (Auto)   


 


Eos % (Auto)   


 


Baso % (Auto)   


 


Neut # (Auto)   


 


Lymph # (Auto)   


 


Mono # (Auto)   


 


Eos # (Auto)   


 


Baso # (Auto)   


 


Sodium   


 


Potassium   


 


Chloride   


 


Carbon Dioxide   


 


Anion Gap   


 


BUN   


 


Creatinine   


 


Est GFR ( Amer)   


 


Est GFR (Non-Af Amer)   


 


POC Glucose (mg/dL)  76  129 H  135 H


 


Random Glucose   


 


Calcium   


 


Total Bilirubin   


 


AST   


 


ALT   


 


Alkaline Phosphatase   


 


Total Protein   


 


Albumin   


 


Globulin   


 


Albumin/Globulin Ratio   














  08/20/18 08/20/18 08/20/18





  06:17 06:17 12:36


 


WBC  14.6 H  


 


RBC  4.10  


 


Hgb  12.3  


 


Hct  36.4  


 


MCV  88.8  


 


MCH  30.0  


 


MCHC  33.8  


 


RDW  12.6  


 


Plt Count  214  


 


MPV  8.9  


 


Neut % (Auto)  75.7 H  


 


Lymph % (Auto)  14.1 L  


 


Mono % (Auto)  8.2  


 


Eos % (Auto)  1.8  


 


Baso % (Auto)  0.2  


 


Neut # (Auto)  11.0 H  


 


Lymph # (Auto)  2.0  


 


Mono # (Auto)  1.2 H  


 


Eos # (Auto)  0.3  


 


Baso # (Auto)  0.0  


 


Sodium   140 


 


Potassium   3.8 


 


Chloride   105 


 


Carbon Dioxide   24 


 


Anion Gap   15 


 


BUN   14 


 


Creatinine   0.9 


 


Est GFR ( Amer)   > 60 


 


Est GFR (Non-Af Amer)   > 60 


 


POC Glucose (mg/dL)    195 H


 


Random Glucose   148 H 


 


Calcium   9.3 


 


Total Bilirubin   0.6 


 


AST   43 H 


 


ALT   30 


 


Alkaline Phosphatase   71 


 


Total Protein   7.2 


 


Albumin   3.8 


 


Globulin   3.4 


 


Albumin/Globulin Ratio   1.1 














  08/20/18





  15:53


 


WBC 


 


RBC 


 


Hgb 


 


Hct 


 


MCV 


 


MCH 


 


MCHC 


 


RDW 


 


Plt Count 


 


MPV 


 


Neut % (Auto) 


 


Lymph % (Auto) 


 


Mono % (Auto) 


 


Eos % (Auto) 


 


Baso % (Auto) 


 


Neut # (Auto) 


 


Lymph # (Auto) 


 


Mono # (Auto) 


 


Eos # (Auto) 


 


Baso # (Auto) 


 


Sodium 


 


Potassium 


 


Chloride 


 


Carbon Dioxide 


 


Anion Gap 


 


BUN 


 


Creatinine 


 


Est GFR ( Amer) 


 


Est GFR (Non-Af Amer) 


 


POC Glucose (mg/dL)  143 H


 


Random Glucose 


 


Calcium 


 


Total Bilirubin 


 


AST 


 


ALT 


 


Alkaline Phosphatase 


 


Total Protein 


 


Albumin 


 


Globulin 


 


Albumin/Globulin Ratio

## 2018-08-21 RX ADMIN — CALCIUM CARBONATE-VITAMIN D TAB 500 MG-200 UNIT SCH TAB: 500-200 TAB at 12:22

## 2018-08-21 RX ADMIN — CILOSTAZOL SCH MG: 50 TABLET ORAL at 12:22

## 2018-08-21 RX ADMIN — Medication SCH TAB: at 12:21

## 2018-08-21 RX ADMIN — CILOSTAZOL SCH MG: 50 TABLET ORAL at 17:23

## 2018-08-21 RX ADMIN — HUMAN INSULIN SCH: 100 INJECTION, SOLUTION SUBCUTANEOUS at 21:35

## 2018-08-21 RX ADMIN — HUMAN INSULIN SCH UNITS: 100 INJECTION, SOLUTION SUBCUTANEOUS at 12:20

## 2018-08-21 RX ADMIN — PANTOPRAZOLE SODIUM SCH MG: 40 TABLET, DELAYED RELEASE ORAL at 12:21

## 2018-08-21 RX ADMIN — GLIPIZIDE SCH MG: 5 TABLET, EXTENDED RELEASE ORAL at 12:21

## 2018-08-21 RX ADMIN — HUMAN INSULIN SCH: 100 INJECTION, SOLUTION SUBCUTANEOUS at 07:44

## 2018-08-21 RX ADMIN — CALCIUM CARBONATE-VITAMIN D TAB 500 MG-200 UNIT SCH TAB: 500-200 TAB at 17:23

## 2018-08-21 RX ADMIN — HUMAN INSULIN SCH: 100 INJECTION, SOLUTION SUBCUTANEOUS at 17:23

## 2018-08-21 NOTE — MRI
Date of service: 



08/21/2018



PROCEDURE:  MRI BRAIN WITHOUT CONTRAST



HISTORY:

bed



COMPARISON:

Noncontrast head CT and head-neck CT angiogram 08/17/2018 and prior 

brain MRI 01/23/2018. 



TECHNIQUE:

Multiplanar, multisequence MR images of the brain were obtained 

without intravenous contrast enhancement.



FINDINGS:

The examination is heavily artifact due to extensive motion 

throughout the exam. 



HEMORRHAGE:

No definite intracranial hemorrhage appreciated overall.



DWI:

Restricted diffusion difficult to prove in a large segment of the mid 

to posterior right MCA distribution but is likely. ADC map is grossly 

distorted limiting the tibial by evaluation.



BRAIN PARENCHYMA:

Diffuse cerebral atrophy chronic microangiopathy are reiterated 

however there is cytotoxic edema identified at the right 

temporoparietal distribution, appearing to spare the right frontal 

and occipital lobes, compatible with acute subacute brain infarction. 

Limited mass effect effaces a few right parietal sulci at the mid to 

superior distribution.  No midline shift. Posterior fossa contents 

reveals no definite suspicious findings in standard images with 

artifacts noted on diffusion-weighted examination.



VENTRICLES:

Unremarkable. No hydrocephalus.



CRANIUM:

Unremarkable.



ORBITS:

Grossly unremarkable.



PARANASAL SINUSES/MASTOIDS:

Clear



VASCULAR SYSTEM:

Skull base flow voids intact.



OTHER FINDINGS:

None. 



IMPRESSION:

Acute to subacute infarct right temporoparietal sub distribution of 

right middle cerebral artery. Overall pattern correlates rather well 

with head-neck CT angiogram 08/17/2018. Limited local mass effect.  

No midline shift. Diffuse cerebral atrophy chronic microangiopathy 

are reiterated as discussed above.  Exam is grossly artifact by 

motion limiting diffusion-weighted imaging. Discussion primarily 

based on standard rather than diffusion-weighted imaging results. 

Artifacts grossly distort ADC map.

## 2018-08-21 NOTE — CP.PCM.PN
Subjective





- Date & Time of Evaluation


Date of Evaluation: 08/21/18


Time of Evaluation: 13:10





- Subjective


Subjective: 





CHIEF COMPLAINTS TODAY :


Patient passed a swallowing test diet order.


  No  Weakness of the left upper extremity improving no speech defect.





ROS.


HEENT :  N.


Resp :       No cough, wheezing ,pleuritic CP ,or hemoptysis 


Cardio :     No anginal  CP, PND, orthopnea, palpitation 


GI :           No abd.pain, n/v ,diarrhea or GI bleeding .


CNS : No headache, vertigo, focal deficit.


Musculoskel :  No joint swelling ,


Derm :        No rash 


Psych :     Normal affect.


Ext :  No  swelling ,calf pain 





PE.


Pt. is alert awake in no distress.


V.S  As noted in the chart 


Head ,ear nose,throat and eyes : Normal.


Neck : Supple with normal carotids.


Lungs: Clear air entry.


Heart : S1 & S2 normal with S4. No murmur.


Abd : Soft non tender with normal bowel sounds.


Neuro : Moves all ext. 


Ext : No edema with intact pulses.Non tender calves 


Derm : No rashes or decubitus ulcer.





LABS/RADIOLOGY:





ASSESSMENT/PLAN : 


Evolving left CVA.


Atrial fibrillation with moderate to rapid ventricular rate


Currently patient is on aspirin and Plavix patient will need anticoagulation 

because of her atrial fibrillation


Neurology to advise on anticoagulation 








Objective





- Vital Signs/Intake and Output


Vital Signs (last 24 hours): 


 











Temp Pulse Resp BP Pulse Ox


 


 98.1 F   96 H  20   131/70   95 


 


 08/21/18 04:00  08/21/18 08:37  08/21/18 04:00  08/21/18 04:00  08/21/18 04:00











- Medications


Medications: 


 Current Medications





Amlodipine Besylate (Norvasc)  10 mg PO DAILY [facility]


   Last Admin: 08/21/18 12:21 Dose:  10 mg


Aspirin (Aspirin Chewable)  81 mg PO DAILY [facility]


   Last Admin: 08/21/18 12:21 Dose:  81 mg


Calcium/Vitamin D (Oyster Shell Calcium/Vitamin D 500 Mg-200 Iu)  1 tab PO BID 

[facility]


   Last Admin: 08/21/18 12:22 Dose:  1 tab


Cilostazol (Pletal)  50 mg PO BID [facility]


   Last Admin: 08/21/18 12:22 Dose:  50 mg


Clopidogrel Bisulfate (Plavix)  75 mg PO DAILY [facility]


   Last Admin: 08/21/18 12:21 Dose:  75 mg


Ferrous Sulfate (Feosol)  325 mg PO DAILY [facility]


   Last Admin: 08/21/18 12:21 Dose:  325 mg


Gabapentin (Neurontin)  300 mg PO DAILY [facility]


   Last Admin: 08/21/18 12:21 Dose:  300 mg


Glipizide (Glucotrol Xl)  5 mg PO DAILY [facility]


   Last Admin: 08/21/18 12:21 Dose:  5 mg


Heparin Sodium (Porcine) (Heparin)  5,000 units SC Q12 [facility]


   Last Admin: 08/21/18 12:22 Dose:  5,000 units


Home Med (Azelastine Hcl [Azelastine Hcl])  0.05 % OU BID [facility]


Hydrochlorothiazide (Hydrodiuril)  25 mg PO DAILY [facility]


   Last Admin: 08/21/18 12:21 Dose:  25 mg


Diltiazem HCl 125 mg/ Sodium (Chloride)  125 mls @ 10 mls/hr IV .[id number] [facility]; 

10 MG/HR


   PRN Reason: Protocol


   Last Admin: 08/20/18 14:07 Dose:  Not Given


Insulin Human Regular (Novolin R)  0 unit SC ACHS [facility]


   PRN Reason: Protocol


   Last Admin: 08/21/18 12:20 Dose:  3 units


Metformin HCl (Glucophage)  1,000 mg PO DAILY [facility]


   Last Admin: 08/21/18 12:20 Dose:  1,000 mg


Multivitamins (Hexavitamin)  1 tab PO DAILY [facility]


   Last Admin: 08/21/18 12:21 Dose:  1 tab


Pantoprazole Sodium (Protonix Ec Tab)  40 mg PO DAILY [facility]


   Last Admin: 08/21/18 12:21 Dose:  40 mg


Rosuvastatin Calcium (Crestor)  5 mg PO HS [facility]


   Last Admin: 08/20/18 22:10 Dose:  5 mg


Sitagliptin Phosphate (Januvia)  50 mg PO DAILY [facility]


   Last Admin: 08/21/18 12:21 Dose:  50 mg











- Labs


Labs: 


 





 08/20/18 06:17 





 08/20/18 06:17 





 











PT  12.9 SECONDS (9.7-12.2)  H  08/17/18  16:04    


 


INR  1.2   08/17/18  16:04    


 


APTT  29 SECONDS (21-34)   08/17/18  16:04

## 2018-08-21 NOTE — CP.PCM.PN
Subjective





- Date & Time of Evaluation


Date of Evaluation: 18


Time of Evaluation: 16:00





- Subjective


Subjective: 





PGY1 [place] note for Neurologist Dr. Rosa.





Patient seen and examined at bedside. No overnight events. Patient states she 

has some left arm weakness but otherwise has no other complaints. Patient 

denies chest pain, SOB, trouble voiding, loss of sensation in arms/legs. 





Objective





- Vital Signs/Intake and Output


Vital Signs (last 24 hours): 


 











Temp Pulse Resp BP Pulse Ox


 


 98.1 F   96 H  20   131/70   95 


 


 18 04:00  18 08:37  18 04:00  18 04:00  18 04:00








Intake and Output: 


 











 18





 06:59 18:59


 


Intake Total 350 240


 


Balance 350 240














- Medications


Medications: 


 Current Medications





Amlodipine Besylate (Norvasc)  10 mg PO DAILY [facility]


   Last Admin: 18 12:21 Dose:  10 mg


Calcium/Vitamin D (Oyster Shell Calcium/Vitamin D 500 Mg-200 Iu)  1 tab PO BID 

[facility]


   Last Admin: 18 12:22 Dose:  1 tab


Cilostazol (Pletal)  50 mg PO BID [facility]


   Last Admin: 18 12:22 Dose:  50 mg


Enoxaparin Sodium (Lovenox)  20 mg SC Q12H [facility]


Ferrous Sulfate (Feosol)  325 mg PO DAILY [facility]


   Last Admin: 18 12:21 Dose:  325 mg


Gabapentin (Neurontin)  300 mg PO DAILY [facility]


   Last Admin: 18 12:21 Dose:  300 mg


Glipizide (Glucotrol Xl)  5 mg PO DAILY [facility]


   Last Admin: 18 12:21 Dose:  5 mg


Home Med (Azelastine Hcl [Azelastine Hcl])  0.05 % OU BID [facility]


Hydrochlorothiazide (Hydrodiuril)  25 mg PO DAILY [facility]


   Last Admin: 18 12:21 Dose:  25 mg


Diltiazem HCl 125 mg/ Sodium (Chloride)  125 mls @ 10 mls/hr IV .[id number] HUBER; 

10 MG/HR


   PRN Reason: Protocol


   Last Admin: 18 14:07 Dose:  Not Given


Insulin Human Regular (Novolin R)  0 unit SC ACHS [facility]


   PRN Reason: Protocol


   Last Admin: 18 12:20 Dose:  3 units


Metformin HCl (Glucophage)  1,000 mg PO DAILY [facility]


   Last Admin: 18 12:20 Dose:  1,000 mg


Multivitamins (Hexavitamin)  1 tab PO DAILY [facility]


   Last Admin: 18 12:21 Dose:  1 tab


Pantoprazole Sodium (Protonix Ec Tab)  40 mg PO DAILY [facility]


   Last Admin: 18 12:21 Dose:  40 mg


Rosuvastatin Calcium (Crestor)  5 mg PO HS [facility]


   Last Admin: 18 22:10 Dose:  5 mg


Sitagliptin Phosphate (Januvia)  50 mg PO DAILY [facility]


   Last Admin: 18 12:21 Dose:  50 mg











- Labs


Labs: 


 





 18 06:17 





 18 06:17 





 











PT  12.9 SECONDS (9.7-12.2)  H  18  16:04    


 


INR  1.2   18  16:04    


 


APTT  29 SECONDS (21-34)   18  16:04    














- Constitutional


Appears: Non-toxic, No Acute Distress





- Head Exam


Head Exam: ATRAUMATIC, NORMAL INSPECTION, NORMOCEPHALIC





- Eye Exam


Eye Exam: EOMI, Normal appearance, PERRL


Pupil Exam: NORMAL ACCOMODATION, PERRL





- ENT Exam


ENT Exam: Mucous Membranes Moist





- Extremities Exam


Additional comments: 





LUE olecraon deformity 2/2 surgical fixation s/p fracture several years ago 





- Neurological Exam


Neurological Exam: Alert, Awake, CN II-XII Intact, Oriented x3


Neuro motor strength exam: Left Upper Extremity: 4, Right Upper Extremity: 5, 

Left Lower Extremity: 5, Right Lower Extremity: 5


Additional comments: 





Normal finger to nose, normal sensory extinction test





- Psychiatric Exam


Psychiatric exam: Normal Affect, Normal Mood





Assessment and Plan





- Assessment and Plan (Free Text)


Assessment: 


Assessment & Plan Discussed with Dr. Rosa.





82 F w/ PMHx of DM & HTN presented to ED w/ 48 hrs of weakness on LUE, found to 

have afib w/ RVR, started on cardizem drip in ED, now on norvasc 10m) CVA w/ residual LUE weakness


- CT: no acute intracranial hemorrhage, moderate to fairly significant chronic 

white matter ischemic changes 


- MRI Head: acute to subacute infarct R temporoparietal sub distribution of R 

MCA


- CTA Head: 50% stenosis at origin & proximal L internal carotid artery


- ECHO: LVH, EF 65%, no vegetations 


- Lipid profile WNL


- Rec: Discontinue aspirin 81 mg, clopidegril 


         Start: Lovenox 20 mg Q12 today 


                  Warfarin 2mg tomorrow ,

## 2018-08-21 NOTE — CP.PCM.PN
Subjective





- Date & Time of Evaluation


Date of Evaluation: 08/21/18


Time of Evaluation: 15:58





- Subjective


Subjective: 


DISCUSSED PLAN WITH DR. MCKEON (NEURO) AND WE WILL STOP THE HEPARIN, ASA AND 

PLAVIX AND START LOVENOX 20 MG SQ (FIRST DOSE NOW; LESS MG RECOMMENDED BY DR. MCKEON 2/2 PT'S AGE AND RISK OF BLEEDING) FOLLOWED BY Q 12 HOURS.  WE WILL PLAN TO 

[facility] WITH COUMADIN STARTING TOMORROW, WITH A GOAL INR BETWEEN 1-2.  DR. MCKEON 

RECOMMENDS TO OBSERVE THE PT UNTIL AT LEAST THURSDAY OR FRIDAY, THEN OK TO DC 

TO [facility] DEPENDING ON HOW SHE PROGRESSES. NO FURTHER ORDERS AT THIS TIME. 








Objective





- Vital Signs/Intake and Output


Vital Signs (last 24 hours): 


 











Temp Pulse Resp BP Pulse Ox


 


 98.1 F   96 H  20   131/70   95 


 


 08/21/18 04:00  08/21/18 08:37  08/21/18 04:00  08/21/18 04:00  08/21/18 04:00








Intake and Output: 


 











 08/21/18 08/21/18





 06:59 18:59


 


Intake Total 350 240


 


Balance 350 240














- Medications


Medications: 


 Current Medications





Amlodipine Besylate (Norvasc)  10 mg PO DAILY [facility]


   Last Admin: 08/21/18 12:21 Dose:  10 mg


Calcium/Vitamin D (Oyster Shell Calcium/Vitamin D 500 Mg-200 Iu)  1 tab PO BID 

[facility]


   Last Admin: 08/21/18 12:22 Dose:  1 tab


Cilostazol (Pletal)  50 mg PO BID [facility]


   Last Admin: 08/21/18 12:22 Dose:  50 mg


Enoxaparin Sodium (Lovenox)  20 mg SC Q12H [facility]


Ferrous Sulfate (Feosol)  325 mg PO DAILY [facility]


   Last Admin: 08/21/18 12:21 Dose:  325 mg


Gabapentin (Neurontin)  300 mg PO DAILY [facility]


   Last Admin: 08/21/18 12:21 Dose:  300 mg


Glipizide (Glucotrol Xl)  5 mg PO DAILY [facility]


   Last Admin: 08/21/18 12:21 Dose:  5 mg


Home Med (Azelastine Hcl [Azelastine Hcl])  0.05 % OU BID [facility]


Hydrochlorothiazide (Hydrodiuril)  25 mg PO DAILY [facility]


   Last Admin: 08/21/18 12:21 Dose:  25 mg


Diltiazem HCl 125 mg/ Sodium (Chloride)  125 mls @ 10 mls/hr IV .[id number] [facility]; 

10 MG/HR


   PRN Reason: Protocol


   Last Admin: 08/20/18 14:07 Dose:  Not Given


Insulin Human Regular (Novolin R)  0 unit SC ACHS [facility]


   PRN Reason: Protocol


   Last Admin: 08/21/18 12:20 Dose:  3 units


Metformin HCl (Glucophage)  1,000 mg PO DAILY [facility]


   Last Admin: 08/21/18 12:20 Dose:  1,000 mg


Multivitamins (Hexavitamin)  1 tab PO DAILY [facility]


   Last Admin: 08/21/18 12:21 Dose:  1 tab


Pantoprazole Sodium (Protonix Ec Tab)  40 mg PO DAILY [facility]


   Last Admin: 08/21/18 12:21 Dose:  40 mg


Rosuvastatin Calcium (Crestor)  5 mg PO HS [facility]


   Last Admin: 08/20/18 22:10 Dose:  5 mg


Sitagliptin Phosphate (Januvia)  50 mg PO DAILY [facility]


   Last Admin: 08/21/18 12:21 Dose:  50 mg











- Labs


Labs: 


 





 08/20/18 06:17 





 08/20/18 06:17 





 











PT  12.9 SECONDS (9.7-12.2)  H  08/17/18  16:04    


 


INR  1.2   08/17/18  16:04    


 


APTT  29 SECONDS (21-34)   08/17/18  16:04

## 2018-08-22 LAB
INR PPP: 1.1
PROTHROMBIN TIME: 12.4 SECONDS (ref 9.7–12.2)

## 2018-08-22 RX ADMIN — ENOXAPARIN SODIUM SCH MG: 40 INJECTION SUBCUTANEOUS at 09:55

## 2018-08-22 RX ADMIN — CALCIUM CARBONATE-VITAMIN D TAB 500 MG-200 UNIT SCH TAB: 500-200 TAB at 19:13

## 2018-08-22 RX ADMIN — HUMAN INSULIN SCH: 100 INJECTION, SOLUTION SUBCUTANEOUS at 07:46

## 2018-08-22 RX ADMIN — CILOSTAZOL SCH MG: 50 TABLET ORAL at 09:56

## 2018-08-22 RX ADMIN — GLIPIZIDE SCH MG: 5 TABLET, EXTENDED RELEASE ORAL at 09:56

## 2018-08-22 RX ADMIN — PANTOPRAZOLE SODIUM SCH MG: 40 TABLET, DELAYED RELEASE ORAL at 09:56

## 2018-08-22 RX ADMIN — Medication SCH TAB: at 09:57

## 2018-08-22 RX ADMIN — CALCIUM CARBONATE-VITAMIN D TAB 500 MG-200 UNIT SCH TAB: 500-200 TAB at 09:56

## 2018-08-22 RX ADMIN — HUMAN INSULIN SCH: 100 INJECTION, SOLUTION SUBCUTANEOUS at 18:08

## 2018-08-22 RX ADMIN — CILOSTAZOL SCH MG: 50 TABLET ORAL at 19:13

## 2018-08-22 RX ADMIN — HUMAN INSULIN SCH UNITS: 100 INJECTION, SOLUTION SUBCUTANEOUS at 12:09

## 2018-08-22 NOTE — CP.PCM.PN
Subjective





- Date & Time of Evaluation


Date of Evaluation: 08/22/18


Time of Evaluation: 14:37





- Subjective


Subjective: 





DISCUSSED ANTICOAGULATION WITH DR. MCKEON THIS AFTERNOON.  WILL CONTINUE LOVENOX. 

START COUMADIN AT 5MG PO TONIGHT.  INR ORDER FOR TODAY IN ORDER FOR COUMADIN TO 

BE APPROVED BY PHARMACY.  WILL CONTINUE TO MONITOR PT.  REPEAT LABS ORDERED FOR 

TOMORROW MORNING. 





Objective





- Vital Signs/Intake and Output


Vital Signs (last 24 hours): 


 











Temp Pulse Resp BP Pulse Ox


 


 97.8 F   81   18   117/58 L  95 


 


 08/22/18 08:10  08/22/18 08:10  08/22/18 08:10  08/22/18 08:10  08/22/18 08:10








Intake and Output: 


 











 08/22/18 08/22/18





 06:59 18:59


 


Intake Total 200 


 


Balance 200 














- Medications


Medications: 


 Current Medications





Amlodipine Besylate (Norvasc)  10 mg PO DAILY [facility]


   Last Admin: 08/22/18 09:56 Dose:  10 mg


Calcium/Vitamin D (Oyster Shell Calcium/Vitamin D 500 Mg-200 Iu)  1 tab PO BID 

[facility]


   Last Admin: 08/22/18 09:56 Dose:  1 tab


Cilostazol (Pletal)  50 mg PO BID [facility]


   Last Admin: 08/22/18 09:56 Dose:  50 mg


Diltiazem HCl (Cardizem)  30 mg PO Q8 [facility]


   Last Admin: 08/22/18 13:25 Dose:  30 mg


Enoxaparin Sodium (Lovenox)  40 mg SC DAILY [facility]


   Last Admin: 08/22/18 09:55 Dose:  40 mg


Ferrous Sulfate (Feosol)  325 mg PO DAILY [facility]


   Last Admin: 08/22/18 09:56 Dose:  325 mg


Gabapentin (Neurontin)  300 mg PO DAILY [facility]


   Last Admin: 08/22/18 09:56 Dose:  300 mg


Glipizide (Glucotrol Xl)  5 mg PO DAILY [facility]


   Last Admin: 08/22/18 09:56 Dose:  5 mg


Home Med (Azelastine Hcl [Azelastine Hcl])  0.05 % OU BID [facility]


Hydrochlorothiazide (Hydrodiuril)  25 mg PO DAILY [facility]


   Last Admin: 08/22/18 09:57 Dose:  25 mg


Insulin Human Regular (Novolin R)  0 unit SC [facility]


   PRN Reason: Protocol


   Last Admin: 08/22/18 12:09 Dose:  2 units


Metformin HCl (Glucophage)  1,000 mg PO DAILY [facility]


   Last Admin: 08/22/18 09:56 Dose:  1,000 mg


Multivitamins (Hexavitamin)  1 tab PO DAILY [facility]


   Last Admin: 08/22/18 09:57 Dose:  1 tab


Pantoprazole Sodium (Protonix Ec Tab)  40 mg PO DAILY [facility]


   Last Admin: 08/22/18 09:56 Dose:  40 mg


Rosuvastatin Calcium (Crestor)  5 mg PO HS [facility]


   Last Admin: 08/21/18 22:17 Dose:  5 mg


Sitagliptin Phosphate (Januvia)  50 mg PO DAILY [facility]


   Last Admin: 08/22/18 09:56 Dose:  50 mg


Warfarin Sodium (Coumadin)  5 mg PO 1800 [facility]


   Stop: 08/22/18 18:01











- Labs


Labs: 


 





 08/20/18 06:17 





 08/20/18 06:17 





 











PT  12.4 SECONDS (9.7-12.2)  H  08/22/18  13:49    


 


INR  1.1   08/22/18  13:49    


 


APTT  29 SECONDS (21-34)   08/17/18  16:04

## 2018-08-22 NOTE — CP.PCM.PN
Subjective





- Date & Time of Evaluation


Date of Evaluation: 08/22/18


Time of Evaluation: 14:03





- Subjective


Subjective: 





CHIEF COMPLAINTS TODAY :


cardiac monitoring shows frequent couplets.  Patient has no complaints 

neurologically patient is intact


ROS.


HEENT :  N.


Resp :       No cough, wheezing ,pleuritic CP ,or hemoptysis 


Cardio :     No anginal  CP, PND, orthopnea, palpitation 


GI :           No abd.pain, n/v ,diarrhea or GI bleeding .


CNS : No headache, vertigo, focal deficit.


Musculoskel :  No joint swelling ,


Derm :        No rash 


Psych :     Normal affect.


Ext :  No  swelling ,calf pain 





PE.


Pt. is alert awake in no distress.


V.S  As noted in the chart 


Head ,ear nose,throat and eyes : Normal.


Neck : Supple with normal carotids.


Lungs: Clear air entry.


Heart : S1 & S2 normal with S4. No murmur.


Abd : Soft non tender with normal bowel sounds.


Neuro : Moves all ext. 


Ext : No edema with intact pulses.Non tender calves 


Derm : No rashes or decubitus ulcer.





LABS/RADIOLOGY:





ASSESSMENT/PLAN : 


Evolving left CVA.


Atrial fibrillation with moderate to rapid ventricular rate


electrolytes are normal.  Will get echo to evaluate left systolic function


Patient is on dual anticoagulation no evidence of bleeding





Objective





- Vital Signs/Intake and Output


Vital Signs (last 24 hours): 


 











Temp Pulse Resp BP Pulse Ox


 


 97.8 F   81   18   117/58 L  95 


 


 08/22/18 08:10  08/22/18 08:10  08/22/18 08:10  08/22/18 08:10  08/22/18 08:10











- Medications


Medications: 


 Current Medications





Amlodipine Besylate (Norvasc)  10 mg PO DAILY [facility]


   Last Admin: 08/22/18 09:56 Dose:  10 mg


Calcium/Vitamin D (Oyster Shell Calcium/Vitamin D 500 Mg-200 Iu)  1 tab PO BID 

[facility]


   Last Admin: 08/22/18 09:56 Dose:  1 tab


Cilostazol (Pletal)  50 mg PO BID [facility]


   Last Admin: 08/22/18 09:56 Dose:  50 mg


Diltiazem HCl (Cardizem)  30 mg PO Q8 [facility]


   Last Admin: 08/22/18 13:25 Dose:  30 mg


Enoxaparin Sodium (Lovenox)  40 mg SC DAILY [facility]


   Last Admin: 08/22/18 09:55 Dose:  40 mg


Ferrous Sulfate (Feosol)  325 mg PO DAILY [facility]


   Last Admin: 08/22/18 09:56 Dose:  325 mg


Gabapentin (Neurontin)  300 mg PO DAILY [facility]


   Last Admin: 08/22/18 09:56 Dose:  300 mg


Glipizide (Glucotrol Xl)  5 mg PO DAILY [facility]


   Last Admin: 08/22/18 09:56 Dose:  5 mg


Home Med (Azelastine Hcl [Azelastine Hcl])  0.05 % OU BID [facility]


Hydrochlorothiazide (Hydrodiuril)  25 mg PO DAILY [facility]


   Last Admin: 08/22/18 09:57 Dose:  25 mg


Ins[facility] Human [facility] ([facility])  [facility]


   PRN Reason: Protocol


   Last Admin: 08/22/18 12:09 Dose:  2 units


Metformin HCl (Glucophage)  1,000 mg PO DAILY [facility]


   Last Admin: 08/22/18 09:56 Dose:  1,000 mg


Multivitamins (Hexavitamin)  1 tab PO DAILY [facility]


   Last Admin: 08/22/18 09:57 Dose:  1 tab


Pantoprazole Sodium (Protonix Ec Tab)  40 mg PO DAILY [facility]


   Last Admin: 08/22/18 09:56 Dose:  40 mg


Rosuvastatin Calcium (Crestor)  5 mg PO HS [facility]


   Last Admin: 08/21/18 22:17 Dose:  5 mg


Sitagliptin Phosphate (Januvia)  50 mg PO DAILY [facility]


   Last Admin: 08/22/18 09:56 Dose:  50 mg


Warfarin Sodium (Coumadin)  5 mg PO 1800 [facility]


   Stop: 08/22/18 18:01











- Labs


Labs: 


 





 08/20/18 06:17 





 08/20/18 06:17 





 











PT  12.4 SECONDS (9.7-12.2)  H  08/22/18  13:49    


 


INR  1.1   08/22/18  13:49    


 


APTT  29 SECONDS (21-34)   08/17/18  16:04

## 2018-08-22 NOTE — CP.PCM.PN
Subjective





- Date & Time of Evaluation


Date of Evaluation: 08/22/18


Time of Evaluation: 07:10





- Subjective


Subjective: 





Ms. Sexton was seen and examined at the bedside. She is able to state her name, 

but unable to verbalize place, and time. She denies any headache, blurred vision

, but unable to follow any commands, able to withdraws from noxious stimuli on 

all extremities with left side weaker in comparison to the right.There  was no 

untoward events overnight.





Objective





- Vital Signs/Intake and Output


Vital Signs (last 24 hours): 


 











Temp Pulse Resp BP Pulse Ox


 


 99.4 F   107 H  20   131/57 L  96 


 


 08/22/18 06:00  08/22/18 06:00  08/22/18 06:00  08/22/18 06:00  08/22/18 06:00








Intake and Output: 


 











 08/22/18 08/22/18





 06:59 18:59


 


Intake Total 200 


 


Balance 200 














- Medications


Medications: 


 Current Medications





Amlodipine Besylate (Norvasc)  10 mg PO DAILY [facility]


   Last Admin: 08/21/18 12:21 Dose:  10 mg


Calcium/Vitamin D (Oyster Shell Calcium/Vitamin D 500 Mg-200 Iu)  1 tab PO BID 

[facility]


   Last Admin: 08/21/18 17:23 Dose:  1 tab


Cilostazol (Pletal)  50 mg PO BID [facility]


   Last Admin: 08/21/18 17:23 Dose:  50 mg


Diltiazem HCl (Cardizem)  30 mg PO Q8 [facility]


   Last Admin: 08/22/18 06:04 Dose:  30 mg


Enoxaparin Sodium (Lovenox)  40 mg SC DAILY [facility]


Ferrous Sulfate (Feosol)  325 mg PO DAILY [facility]


   Last Admin: 08/21/18 12:21 Dose:  325 mg


Gabapentin (Neurontin)  300 mg PO DAILY [facility]


   Last Admin: 08/21/18 12:21 Dose:  300 mg


Glipizide (Glucotrol Xl)  5 mg PO DAILY [facility]


   Last Admin: 08/21/18 12:21 Dose:  5 mg


Home Med (Azelastine Hcl [Azelastine Hcl])  0.05 % OU BID [facility]


Hydrochlorothiazide (Hydrodiuril)  25 mg PO DAILY [facility]


   Last Admin: 08/21/18 12:21 Dose:  25 mg


Insulin Human Regular (Novolin R)  0 unit SC [facility]


   PRN Reason: Protocol


   Last Admin: 08/21/18 21:35 Dose:  Not Given


Metformin HCl (Glucophage)  1,000 mg PO DAILY [facility]


   Last Admin: 08/21/18 12:20 Dose:  1,000 mg


Multivitamins (Hexavitamin)  1 tab PO DAILY [facility]


   Last Admin: 08/21/18 12:21 Dose:  1 tab


Pantoprazole Sodium (Protonix Ec Tab)  40 mg PO DAILY [facility]


   Last Admin: 08/21/18 12:21 Dose:  40 mg


Rosuvastatin Calcium (Crestor)  5 mg PO HS [facility]


   Last Admin: 08/21/18 22:17 Dose:  5 mg


Sitagliptin Phosphate (Januvia)  50 mg PO DAILY [facility]


   Last Admin: 08/21/18 12:21 Dose:  50 mg











- Labs


Labs: 


 





 08/20/18 06:17 





 08/20/18 06:17 





 











PT  12.9 SECONDS (9.7-12.2)  H  08/17/18  16:04    


 


INR  1.2   08/17/18  16:04    


 


APTT  29 SECONDS (21-34)   08/17/18  16:04    














- Constitutional


Appears: No Acute Distress





- Head Exam


Head Exam: NORMAL INSPECTION





- Eye Exam


Pupil Exam: Miosis


Additional comments: 





2 mm





- Neurological Exam


Neurological Exam: Awake


Neuro motor strength exam: Left Upper Extremity: 2/1, Right Upper Extremity: 3, 

Left Lower Extremity: 2/1, Right Lower Extremity: 2/1


Additional comments: 





awake, unable to follow simple commands





Assessment and Plan


(1) CVA (cerebral vascular accident)


Assessment & Plan: 


Continue all current medical, physical, occupational therapies. Recommend 

speech eval and treat, anticoagulant for  a-fib and secondary prevention of 

stroke, hydration, rehab  for discharge planning, blood pressure control, treat 

any underlying infection and electrolyte abnormalities, keep head of bed 

elevated at least 30 degrees for brain perfusion. 


Status: Acute

## 2018-08-23 LAB
BUN SERPL-MCNC: 14 MG/DL (ref 7–17)
BUN SERPL-MCNC: 14 MG/DL (ref 7–17)
CALCIUM SERPL-MCNC: 9 MG/DL (ref 8.6–10.4)
CALCIUM SERPL-MCNC: 9.2 MG/DL (ref 8.6–10.4)
ERYTHROCYTE [DISTWIDTH] IN BLOOD BY AUTOMATED COUNT: 12.8 % (ref 11.5–14.5)
GFR NON-AFRICAN AMERICAN: > 60
GFR NON-AFRICAN AMERICAN: > 60
HGB BLD-MCNC: 11.4 G/DL (ref 11–16)
MCH RBC QN AUTO: 29.6 PG (ref 27–31)
MCHC RBC AUTO-ENTMCNC: 33.8 G/DL (ref 33–37)
MCV RBC AUTO: 87.7 FL (ref 81–99)
PLATELET # BLD: 201 K/UL (ref 130–400)
PMV BLD AUTO: 9.4 FL (ref 7.2–11.7)
RBC # BLD AUTO: 3.85 MIL/UL (ref 3.8–5.2)
WBC # BLD AUTO: 11.9 K/UL (ref 4.8–10.8)

## 2018-08-23 RX ADMIN — HUMAN INSULIN SCH UNITS: 100 INJECTION, SOLUTION SUBCUTANEOUS at 12:28

## 2018-08-23 RX ADMIN — CALCIUM CARBONATE-VITAMIN D TAB 500 MG-200 UNIT SCH TAB: 500-200 TAB at 10:05

## 2018-08-23 RX ADMIN — HUMAN INSULIN SCH: 100 INJECTION, SOLUTION SUBCUTANEOUS at 07:54

## 2018-08-23 RX ADMIN — CALCIUM CARBONATE-VITAMIN D TAB 500 MG-200 UNIT SCH TAB: 500-200 TAB at 18:06

## 2018-08-23 RX ADMIN — ENOXAPARIN SODIUM SCH MG: 40 INJECTION SUBCUTANEOUS at 10:06

## 2018-08-23 RX ADMIN — CILOSTAZOL SCH MG: 50 TABLET ORAL at 18:06

## 2018-08-23 RX ADMIN — PANTOPRAZOLE SODIUM SCH MG: 40 TABLET, DELAYED RELEASE ORAL at 10:06

## 2018-08-23 RX ADMIN — GLIPIZIDE SCH MG: 5 TABLET, EXTENDED RELEASE ORAL at 10:06

## 2018-08-23 RX ADMIN — HUMAN INSULIN SCH: 100 INJECTION, SOLUTION SUBCUTANEOUS at 17:12

## 2018-08-23 RX ADMIN — Medication SCH TAB: at 10:06

## 2018-08-23 RX ADMIN — CILOSTAZOL SCH MG: 50 TABLET ORAL at 10:05

## 2018-08-23 RX ADMIN — HUMAN INSULIN SCH: 100 INJECTION, SOLUTION SUBCUTANEOUS at 21:14

## 2018-08-23 NOTE — CP.PCM.PN
Subjective





- Date & Time of Evaluation


Date of Evaluation: 08/23/18


Time of Evaluation: 06:32





- Subjective


Subjective: 





Ms. Sexton was seen and examined at the bedside. She is more awake, does let the 

staff knows her needs, but refused to answer any questions. She is able to 

follow some commands such as opening her mouth, raising her bilateral upper and 

lower  extremities, moves from side to side. There was no untoward events 

overnight.





Objective





- Vital Signs/Intake and Output


Vital Signs (last 24 hours): 


 











Temp Pulse Resp BP Pulse Ox


 


 98.2 F   108 H  20   139/64   93 L


 


 08/23/18 04:00  08/23/18 04:12  08/23/18 04:00  08/23/18 04:00  08/23/18 04:00








Intake and Output: 


 











 08/22/18 08/23/18





 18:59 06:59


 


Intake Total  250


 


Balance  250














- Medications


Medications: 


 Current Medications





Amlodipine Besylate (Norvasc)  10 mg PO DAILY [facility]


   Last Admin: 08/22/18 09:56 Dose:  10 mg


Calcium/Vitamin D (Oyster Shell Calcium/Vitamin D 500 Mg-200 Iu)  1 tab PO BID 

[facility]


   Last Admin: 08/22/18 19:13 Dose:  1 tab


Cilostazol (Pletal)  50 mg PO BID [facility]


   Last Admin: 08/22/18 19:13 Dose:  50 mg


Diltiazem HCl (Cardizem)  30 mg PO Q8 [facility]


   Last Admin: 08/23/18 05:13 Dose:  30 mg


Enoxaparin Sodium (Lovenox)  40 mg SC DAILY [facility]


   Last Admin: 08/22/18 09:55 Dose:  40 mg


Ferrous Sulfate (Feosol)  325 mg PO DAILY [facility]


   Last Admin: 08/22/18 09:56 Dose:  325 mg


Gabapentin (Neurontin)  300 mg PO DAILY [facility]


   Last Admin: 08/22/18 09:56 Dose:  300 mg


Glipizide (Glucotrol Xl)  5 mg PO DAILY [facility]


   Last Admin: 08/22/18 09:56 Dose:  5 mg


Home Med (Azelastine Hcl [Azelastine Hcl])  0.05 % OU BID [facility]


Hydrochlorothiazide (Hydrodiuril)  25 mg PO DAILY [facility]


   Last Admin: 08/22/18 09:57 Dose:  25 mg


Insulin Human Regular (Novolin R)  0 unit SC [facility]


   PRN Reason: Protocol


   Last Admin: 08/22/18 18:08 Dose:  Not Given


Metformin HCl (Glucophage)  1,000 mg PO DAILY [facility]


   Last Admin: 08/22/18 09:56 Dose:  1,000 mg


Multivitamins (Hexavitamin)  1 tab PO DAILY [facility]


   Last Admin: 08/22/18 09:57 Dose:  1 tab


Pantoprazole Sodium (Protonix Ec Tab)  40 mg PO DAILY [facility]


   Last Admin: 08/22/18 09:56 Dose:  40 mg


Rosuvastatin Calcium (Crestor)  5 mg PO HS [facility]


   Last Admin: 08/22/18 21:46 Dose:  5 mg


Sitagliptin Phosphate (Januvia)  50 mg PO DAILY [facility]


   Last Admin: 08/22/18 09:56 Dose:  50 mg











- Labs


Labs: 


 





 08/20/18 06:17 





 08/20/18 06:17 





 











PT  12.4 SECONDS (9.7-12.2)  H  08/22/18  13:49    


 


INR  1.1   08/22/18  13:49    


 


APTT  29 SECONDS (21-34)   08/17/18  16:04    














- Constitutional


Appears: No Acute Distress





- Head Exam


Head Exam: NORMAL INSPECTION





- Eye Exam


Pupil Exam: Miosis, PERRL


Additional comments: 





2 mm





- Neurological Exam


Neurological Exam: Awake


Neuro motor strength exam: Left Upper Extremity: 3, Right Upper Extremity: 4, 

Left Lower Extremity: 3, Right Lower Extremity: 4


Additional comments: 





neurological unchanged from previous examination.





Assessment and Plan


(1) CVA (cerebral vascular accident)


Assessment & Plan: 


Continue all current medical, physical, occupational therapies, and speech 

therapies. Recommend anticoagulant for  a-fib and secondary prevention of stroke

,keep the INR between 2-3 if coumadin is being utilize, hydration, rehab  for 

discharge planning, blood pressure control, treat any underlying infection and 

electrolyte abnormalities, keep head of bed elevated at least 30 degrees for 

brain perfusion, and cognitive therapy. 


Status: Acute

## 2018-08-23 NOTE — CP.PCM.PN
Subjective





- Date & Time of Evaluation


Date of Evaluation: 08/23/18


Time of Evaluation: 14:32





- Subjective


Subjective: 





SPOKE WITH DR. MATIAS REGARDING ANTICOAGULATION.  MY ORDERS FOR CBC, AND PT/INR 

FROM THIS MORNING WERE CANCELLED, AS THE SPECIMEN WAS CLOTTED PER RN MENA.  WE 

WILL D/C LOVENOX TODAY AND START PT ON XARELTO 15 MG PO DAILY.  WE WILL MONITOR 

FOR BLEEDING OVER THE NEXT COUPLE OF DAYS AND POSSIBLY D/C TO [facility] OVER THE 

WEEKEND.  PER  THE PT REQUESTED [facility] AT [facility].  NO FURTHER ORDERS. 





Objective





- Vital Signs/Intake and Output


Vital Signs (last 24 hours): 


 











Temp Pulse Resp BP Pulse Ox


 


 98.2 F   102 H  20   139/64   93 L


 


 08/23/18 04:00  08/23/18 07:28  08/23/18 04:00  08/23/18 04:00  08/23/18 04:00








Intake and Output: 


 











 08/23/18 08/23/18





 06:59 18:59


 


Intake Total 250 


 


Balance 250 














- Medications


Medications: 


 Current Medications





Amlodipine Besylate (Norvasc)  10 mg PO DAILY [facility]


   Last Admin: 08/23/18 10:06 Dose:  10 mg


Calcium/Vitamin D (Oyster Shell Calcium/Vitamin D 500 Mg-200 Iu)  1 tab PO BID 

[facility]


   Last Admin: 08/23/18 10:05 Dose:  1 tab


Cilostazol (Pletal)  50 mg PO BID [facility]


   Last Admin: 08/23/18 10:05 Dose:  50 mg


Diltiazem HCl (Cardizem)  30 mg PO Q8 [facility]


   Last Admin: 08/23/18 14:06 Dose:  30 mg


Ferrous Sulfate (Feosol)  325 mg PO DAILY [facility]


   Last Admin: 08/23/18 10:09 Dose:  325 mg


Gabapentin (Neurontin)  300 mg PO DAILY [facility]


   Last Admin: 08/23/18 10:05 Dose:  300 mg


Glipizide (Glucotrol Xl)  5 mg PO DAILY [facility]


   Last Admin: 08/23/18 10:06 Dose:  5 mg


Home Med (Azelastine Hcl [Azelastine Hcl])  0.05 % OU BID [facility]


Hydrochlorothiazide (Hydrodiuril)  25 mg PO DAILY [facility]


   Last Admin: 08/23/18 10:06 Dose:  25 mg


Insulin Human Regular (Novolin R)  [facility]


   PRN Reason: Protocol


   Last Admin: 08/23/18 12:28 Dose:  2 units


Metformin HCl (Glucophage)  1,000 mg PO DAILY [facility]


   Last Admin: 08/23/18 10:09 Dose:  1,000 mg


Multivitamins (Hexavitamin)  1 tab PO DAILY [facility]


   Last Admin: 08/23/18 10:06 Dose:  1 tab


Pantoprazole Sodium (Protonix Ec Tab)  40 mg PO DAILY [facility]


   Last Admin: 08/23/18 10:06 Dose:  40 mg


Rivaroxaban (Xarelto)  15 mg PO DAILY [facility]


Rosuvastatin Calcium (Crestor)  5 mg PO HS [facility]


   Last Admin: 08/22/18 21:46 Dose:  5 mg


Sitagliptin Phosphate (Januvia)  50 mg PO DAILY [facility]


   Last Admin: 08/23/18 10:06 Dose:  50 mg











- Labs


Labs: 


 





 08/20/18 06:17 





 08/23/18 11:17 





 











PT  12.4 SECONDS (9.7-12.2)  H  08/22/18  13:49    


 


INR  1.1   08/22/18  13:49    


 


APTT  29 SECONDS (21-34)   08/17/18  16:04

## 2018-08-24 RX ADMIN — CALCIUM CARBONATE-VITAMIN D TAB 500 MG-200 UNIT SCH TAB: 500-200 TAB at 17:41

## 2018-08-24 RX ADMIN — HUMAN INSULIN SCH: 100 INJECTION, SOLUTION SUBCUTANEOUS at 07:26

## 2018-08-24 RX ADMIN — HUMAN INSULIN SCH: 100 INJECTION, SOLUTION SUBCUTANEOUS at 17:15

## 2018-08-24 RX ADMIN — HUMAN INSULIN SCH UNITS: 100 INJECTION, SOLUTION SUBCUTANEOUS at 12:24

## 2018-08-24 RX ADMIN — CALCIUM CARBONATE-VITAMIN D TAB 500 MG-200 UNIT SCH TAB: 500-200 TAB at 09:21

## 2018-08-24 RX ADMIN — CILOSTAZOL SCH MG: 50 TABLET ORAL at 09:22

## 2018-08-24 RX ADMIN — HUMAN INSULIN SCH: 100 INJECTION, SOLUTION SUBCUTANEOUS at 22:21

## 2018-08-24 RX ADMIN — GLIPIZIDE SCH MG: 5 TABLET, EXTENDED RELEASE ORAL at 09:21

## 2018-08-24 RX ADMIN — PANTOPRAZOLE SODIUM SCH MG: 40 TABLET, DELAYED RELEASE ORAL at 09:21

## 2018-08-24 RX ADMIN — Medication SCH TAB: at 09:21

## 2018-08-24 RX ADMIN — CILOSTAZOL SCH MG: 50 TABLET ORAL at 17:41

## 2018-08-24 NOTE — CP.PCM.DIS
Provider





- Provider


Date of Admission: 


08/17/18 18:58





Attending physician: 


Alex Ribera MD





Time Spent in preparation of Discharge (in minutes): 35





Hospital Course





- Lab Results


Lab Results: 


 Micro Results





08/17/18 20:28   Urine,Clean Catch   Urine Culture - Final


                            <10,000 CFU/ML.


                            MULTIPLE SPECIES. PROBABLE CONTAMINATION.





 Most Recent Lab Values











WBC  11.9 K/uL (4.8-10.8)  H  08/23/18  16:50    


 


RBC  3.85 Mil/uL (3.80-5.20)   08/23/18  16:50    


 


Hgb  11.4 g/dL (11.0-16.0)   08/23/18  16:50    


 


Hct  33.7 % (34.0-47.0)  L  08/23/18  16:50    


 


MCV  87.7 fL (81.0-99.0)   08/23/18  16:50    


 


MCH  29.6 pg (27.0-31.0)   08/23/18  16:50    


 


MCHC  33.8 g/dL (33.0-37.0)   08/23/18  16:50    


 


RDW  12.8 % (11.5-14.5)   08/23/18  16:50    


 


Plt Count  201 K/uL (130-400)   08/23/18  16:50    


 


MPV  9.4 fL (7.2-11.7)   08/23/18  16:50    


 


Neut % (Auto)  75.7 % (50.0-75.0)  H  08/20/18  06:17    


 


Lymph % (Auto)  14.1 % (20.0-40.0)  L  08/20/18  06:17    


 


Mono % (Auto)  8.2 % (0.0-10.0)   08/20/18  06:17    


 


Eos % (Auto)  1.8 % (0.0-4.0)   08/20/18  06:17    


 


Baso % (Auto)  0.2 % (0.0-2.0)   08/20/18  06:17    


 


Neut # (Auto)  11.0 K/uL (1.8-7.0)  H  08/20/18  06:17    


 


Lymph # (Auto)  2.0 K/uL (1.0-4.3)   08/20/18  06:17    


 


Mono # (Auto)  1.2 K/uL (0.0-0.8)  H  08/20/18  06:17    


 


Eos # (Auto)  0.3 K/uL (0.0-0.7)   08/20/18  06:17    


 


Baso # (Auto)  0.0 K/uL (0.0-0.2)   08/20/18  06:17    


 


PT  12.4 SECONDS (9.7-12.2)  H  08/22/18  13:49    


 


INR  1.1   08/22/18  13:49    


 


APTT  29 SECONDS (21-34)   08/17/18  16:04    


 


Sodium  135 mmol/L (132-148)   08/23/18  16:50    


 


Potassium  3.7 mmol/L (3.6-5.2)   08/23/18  16:50    


 


Chloride  101 mmol/L ()   08/23/18  16:50    


 


Carbon Dioxide  23 mmol/L (22-30)   08/23/18  16:50    


 


Anion Gap  16  (10-20)   08/23/18  16:50    


 


BUN  14 mg/dL (7-17)   08/23/18  16:50    


 


Creatinine  0.8 mg/dL (0.7-1.2)   08/23/18  16:50    


 


Est GFR ( Amer)  > 60   08/23/18  16:50    


 


Est GFR (Non-Af Amer)  > 60   08/23/18  16:50    


 


POC Glucose (mg/dL)  117 mg/dL ()  H  08/23/18  20:48    


 


Random Glucose  109 mg/dL ()  H  08/23/18  16:50    


 


Hemoglobin A1c  6.8 % (4.2-6.5)  H  08/17/18  16:04    


 


Calcium  9.0 mg/dl (8.6-10.4)   08/23/18  16:50    


 


Phosphorus  3.1 mg/dL (2.5-4.5)   08/19/18  08:21    


 


Magnesium  1.5 mg/dL (1.6-2.3)  L  08/19/18  08:21    


 


Total Bilirubin  0.6 mg/dL (0.2-1.3)   08/20/18  06:17    


 


AST  43 U/L (14-36)  H  08/20/18  06:17    


 


ALT  30 U/L (9-52)   08/20/18  06:17    


 


Alkaline Phosphatase  71 U/L ()   08/20/18  06:17    


 


Troponin I  0.0740 ng/mL (0.00-0.120)   08/17/18  16:04    


 


Total Protein  7.2 g/dL (6.3-8.3)   08/20/18  06:17    


 


Albumin  3.8 g/dL (3.5-5.0)   08/20/18  06:17    


 


Globulin  3.4 gm/dL (2.2-3.9)   08/20/18  06:17    


 


Albumin/Globulin Ratio  1.1  (1.0-2.1)   08/20/18  06:17    


 


Triglycerides  90 mg/dL (0-149)   08/17/18  16:04    


 


Cholesterol  152 mg/dL (0-199)   08/17/18  16:04    


 


LDL Cholesterol Direct  68 mg/dL (0-129)   08/17/18  16:04    


 


HDL Cholesterol  52 mg/dL (30-70)   08/17/18  16:04    


 


Urine Color  Yellow  (YELLOW)   08/17/18  20:28    


 


Urine Clarity  Hazy  (Clear)   08/17/18  20:28    


 


Urine pH  5.0  (5.0-8.0)   08/17/18  20:28    


 


Ur Specific Gravity  1.034  (1.003-1.030)  H  08/17/18  20:28    


 


Urine Protein  Negative mg/dL (NEGATIVE)   08/17/18  20:28    


 


Urine Glucose (UA)  Normal mg/dL (Normal)   08/17/18  20:28    


 


Urine Ketones  Negative mg/dL (NEGATIVE)   08/17/18  20:28    


 


Urine Blood  Negative  (NEGATIVE)   08/17/18  20:28    


 


Urine Nitrate  Negative  (NEGATIVE)   08/17/18  20:28    


 


Urine Bilirubin  Negative  (NEGATIVE)   08/17/18  20:28    


 


Urine Urobilinogen  Normal mg/dL (0.2-1.0)   08/17/18  20:28    


 


Ur Leukocyte Esterase  2+ Zohaib/uL (Negative)  H  08/17/18  20:28    


 


Urine WBC (Auto)  33 /hpf (0-5)  H  08/17/18  20:28    


 


Urine RBC (Auto)  1 /hpf (0-3)   08/17/18  20:28    


 


Ur Squamous Epith Cells  7 /hpf (0-5)  H  08/17/18  20:28    














- Hospital Course


Hospital Course: 





More than 48 hours ago patient complained of weakness of the left upper 

extremity and drooping of the drooping of the left side of the face.  Patient 

was also found to be in atrial fibrillation with rapid ventricular rate 

requiring IV Cardizem.  Patient atrial fibrillation is new onset.  Patient has 

history of diabetes hypertension and previous history of UTIs and COPD.  

Patient was evaluated by the neurology and was put on aspirin and Plavix.  CAT 

scan of the head did not show any acute infarct or bleed





Neurology consult was obtained.  Patient underwent neuro workup MRI of the 

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


Patient was initially treated with aspirin and Plavix and was changed to 

Lovenox there was no evidence of any internal or external bleeding.  Patient 

was switched to Xarelto.


Patient continued to have atrial fibrillation for a few days requiring IV 

Cardizem and then was switched to by mouth.


Echocardiogram showed normal left ventricle ejection fraction of 60%.  1 of the 

day the patient had an episode of ventricular bigeminy which subsided without 

any treatment.


Patient currently has recovered the weakness on the left upper extremity but 

appears to be weak and not ambulating will be transferred to the rehab of the 

family's choice.





August 25, 2018


Patient was transferred to rehab next day due to non availability of bed in the 

nursing home.  Currently patient is  stable   transfer











Discharge Exam





- Head Exam


Head Exam: NORMAL INSPECTION





Discharge Plan





- Follow Up Plan


Condition: GUARDED


Disposition: HOME/ ROUTINE

## 2018-08-24 NOTE — CARD
--------------- APPROVED REPORT --------------





Date of service: 08/22/2018



EKG Measurement

Heart [id number]

[id number]

[id number]

[id number]

[id number]



<Conclusion>

Sinus rhythm with frequent and consecutive premature ventricular 

complexes

Left ventricular hypertrophy with repolarization abnormality

Abnormal ECG

## 2018-08-24 NOTE — CP.PCM.PN
Subjective





- Date & Time of Evaluation


Date of Evaluation: 08/24/18


Time of Evaluation: 06:37





- Subjective


Subjective: 





Ms. Sexton was seen and examined at the bedside. She is more awake, does let the 

staff knows her needs, but refused to answer any questions. She is able to 

follow some commands such as opening her mouth, raising her bilateral upper and 

lower  extremities, moves from side to side. She remains on 1:1 patient safety. 

There was no untoward events overnight.





Objective





- Vital Signs/Intake and Output


Vital Signs (last 24 hours): 


 











Temp Pulse Resp BP Pulse Ox


 


 98.2 F   108 H  20   129/72   95 


 


 08/23/18 23:20  08/24/18 05:25  08/23/18 23:20  08/24/18 05:25  08/23/18 23:20








Intake and Output: 


 











 08/23/18 08/24/18





 18:59 06:59


 


Intake Total 240 


 


Balance 240 














- Medications


Medications: 


 Current Medications





Amlodipine Besylate (Norvasc)  10 mg PO DAILY [facility]


   Last Admin: 08/23/18 10:06 Dose:  10 mg


Calcium/Vitamin D (Oyster Shell Calcium/Vitamin D 500 Mg-200 Iu)  1 tab PO BID 

[facility]


   Last Admin: 08/23/18 18:06 Dose:  1 tab


Cilostazol (Pletal)  50 mg PO BID [facility]


   Last Admin: 08/23/18 18:06 Dose:  50 mg


Diltiazem HCl (Cardizem)  30 mg PO Q8 [facility]


   Last Admin: 08/24/18 05:29 Dose:  30 mg


Ferrous Sulfate (Feosol)  325 mg PO DAILY [facility]


   Last Admin: 08/23/18 10:09 Dose:  325 mg


Gabapentin (Neurontin)  300 mg PO DAILY [facility]


   Last Admin: 08/23/18 10:05 Dose:  300 mg


Glipizide (Glucotrol Xl)  5 mg PO DAILY [facility]


   Last Admin: 08/23/18 10:06 Dose:  5 mg


Home Med (Azelastine Hcl [Azelastine Hcl])  0.05 % OU BID [facility]


Hydrochlorothiazide (Hydrodiuril)  25 mg PO DAILY [facility]


   Last Admin: 08/23/18 10:06 Dose:  25 mg


Insulin Human Regular (Novolin R)  0 unit SC [facility]


   PRN Reason: Protocol


   Last Admin: 08/23/18 21:14 Dose:  Not Given


Metformin HCl (Glucophage)  1,000 mg PO DAILY [facility]


   Last Admin: 08/23/18 10:09 Dose:  1,000 mg


Multivitamins (Hexavitamin)  1 tab PO DAILY [facility]


   Last Admin: 08/23/18 10:06 Dose:  1 tab


Pantoprazole Sodium (Protonix Ec Tab)  40 mg PO DAILY [facility]


   Last Admin: 08/23/18 10:06 Dose:  40 mg


Rivaroxaban (Xarelto)  15 mg PO DAILY [facility]


   Last Admin: 08/23/18 18:05 Dose:  15 mg


Rosuvastatin Calcium (Crestor)  5 mg PO HS [facility]


   Last Admin: 08/23/18 21:11 Dose:  5 mg


Sitagliptin Phosphate (Januvia)  50 mg PO DAILY [facility]


   Last Admin: 08/23/18 10:06 Dose:  50 mg











- Labs


Labs: 


 





 08/23/18 16:50 





 08/23/18 16:50 





 











PT  12.4 SECONDS (9.7-12.2)  H  08/22/18  13:49    


 


INR  1.1   08/22/18  13:49    


 


APTT  29 SECONDS (21-34)   08/17/18  16:04    














- Constitutional


Appears: No Acute Distress





- Head Exam


Head Exam: NORMAL INSPECTION





- Eye Exam


Pupil Exam: Miosis





- Neurological Exam


Neurological Exam: Awake


Neuro motor strength exam: Left Upper Extremity: 3, Right Upper Extremity: 4, 

Left Lower Extremity: 3, Right Lower Extremity: 4


Additional comments: 





neurological unchanged from previous examination.





Assessment and Plan


(1) CVA (cerebral vascular accident)


Assessment & Plan: 


Continue all current medical, physical, occupational therapies, and speech 

therapies. Recommend  hydration, rehab  for discharge planning, blood pressure 

control, treat any underlying infection and electrolyte abnormalities, keep 

head of bed elevated at least 30 degrees for brain perfusion, and cognitive 

therapy. 


Status: Acute

## 2018-08-25 VITALS
OXYGEN SATURATION: 94 % | DIASTOLIC BLOOD PRESSURE: 61 MMHG | TEMPERATURE: 98.6 F | SYSTOLIC BLOOD PRESSURE: 118 MMHG | RESPIRATION RATE: 20 BRPM | HEART RATE: 107 BPM

## 2018-08-25 RX ADMIN — CILOSTAZOL SCH MG: 50 TABLET ORAL at 10:18

## 2018-08-25 RX ADMIN — HUMAN INSULIN SCH: 100 INJECTION, SOLUTION SUBCUTANEOUS at 17:04

## 2018-08-25 RX ADMIN — HUMAN INSULIN SCH: 100 INJECTION, SOLUTION SUBCUTANEOUS at 08:03

## 2018-08-25 RX ADMIN — CALCIUM CARBONATE-VITAMIN D TAB 500 MG-200 UNIT SCH TAB: 500-200 TAB at 10:18

## 2018-08-25 RX ADMIN — HUMAN INSULIN SCH UNITS: 100 INJECTION, SOLUTION SUBCUTANEOUS at 11:30

## 2018-08-25 RX ADMIN — Medication SCH TAB: at 10:19

## 2018-08-25 RX ADMIN — PANTOPRAZOLE SODIUM SCH MG: 40 TABLET, DELAYED RELEASE ORAL at 10:19

## 2018-08-25 RX ADMIN — GLIPIZIDE SCH MG: 5 TABLET, EXTENDED RELEASE ORAL at 10:19
